# Patient Record
Sex: MALE | Race: ASIAN | NOT HISPANIC OR LATINO | URBAN - METROPOLITAN AREA
[De-identification: names, ages, dates, MRNs, and addresses within clinical notes are randomized per-mention and may not be internally consistent; named-entity substitution may affect disease eponyms.]

---

## 2018-04-18 ENCOUNTER — INPATIENT (INPATIENT)
Facility: HOSPITAL | Age: 63
LOS: 6 days | Discharge: HOME | DRG: 189 | End: 2018-04-25
Attending: HOSPITALIST | Admitting: INTERNAL MEDICINE
Payer: MEDICAID

## 2018-04-18 VITALS
HEART RATE: 56 BPM | OXYGEN SATURATION: 91 % | SYSTOLIC BLOOD PRESSURE: 186 MMHG | DIASTOLIC BLOOD PRESSURE: 82 MMHG | RESPIRATION RATE: 18 BRPM

## 2018-04-18 DIAGNOSIS — E11.9 TYPE 2 DIABETES MELLITUS WITHOUT COMPLICATIONS: ICD-10-CM

## 2018-04-18 DIAGNOSIS — I50.9 HEART FAILURE, UNSPECIFIED: ICD-10-CM

## 2018-04-18 DIAGNOSIS — J96.02 ACUTE RESPIRATORY FAILURE WITH HYPERCAPNIA: ICD-10-CM

## 2018-04-18 DIAGNOSIS — Z98.49 CATARACT EXTRACTION STATUS, UNSPECIFIED EYE: Chronic | ICD-10-CM

## 2018-04-18 DIAGNOSIS — I10 ESSENTIAL (PRIMARY) HYPERTENSION: ICD-10-CM

## 2018-04-18 DIAGNOSIS — Z29.9 ENCOUNTER FOR PROPHYLACTIC MEASURES, UNSPECIFIED: ICD-10-CM

## 2018-04-18 DIAGNOSIS — R00.1 BRADYCARDIA, UNSPECIFIED: ICD-10-CM

## 2018-04-18 LAB
ALBUMIN SERPL ELPH-MCNC: 3.6 G/DL — SIGNIFICANT CHANGE UP (ref 3.3–5)
ALP SERPL-CCNC: 67 U/L — SIGNIFICANT CHANGE UP (ref 40–120)
ALT FLD-CCNC: 12 U/L — SIGNIFICANT CHANGE UP (ref 10–45)
ANION GAP SERPL CALC-SCNC: 8 MMOL/L — SIGNIFICANT CHANGE UP (ref 5–17)
AST SERPL-CCNC: 10 U/L — SIGNIFICANT CHANGE UP (ref 10–40)
BASOPHILS # BLD AUTO: 0 K/UL — SIGNIFICANT CHANGE UP (ref 0–0.2)
BASOPHILS NFR BLD AUTO: 0.7 % — SIGNIFICANT CHANGE UP (ref 0–2)
BILIRUB SERPL-MCNC: 0.4 MG/DL — SIGNIFICANT CHANGE UP (ref 0.2–1.2)
BUN SERPL-MCNC: 12 MG/DL — SIGNIFICANT CHANGE UP (ref 7–23)
CALCIUM SERPL-MCNC: 9 MG/DL — SIGNIFICANT CHANGE UP (ref 8.4–10.5)
CHLORIDE SERPL-SCNC: 99 MMOL/L — SIGNIFICANT CHANGE UP (ref 96–108)
CO2 SERPL-SCNC: 33 MMOL/L — HIGH (ref 22–31)
CREAT SERPL-MCNC: 0.92 MG/DL — SIGNIFICANT CHANGE UP (ref 0.5–1.3)
EOSINOPHIL # BLD AUTO: 0.3 K/UL — SIGNIFICANT CHANGE UP (ref 0–0.5)
EOSINOPHIL NFR BLD AUTO: 6 % — SIGNIFICANT CHANGE UP (ref 0–6)
GAS PNL BLDV: SIGNIFICANT CHANGE UP
GLUCOSE SERPL-MCNC: 192 MG/DL — HIGH (ref 70–99)
HCT VFR BLD CALC: 42.4 % — SIGNIFICANT CHANGE UP (ref 39–50)
HGB BLD-MCNC: 13.4 G/DL — SIGNIFICANT CHANGE UP (ref 13–17)
LYMPHOCYTES # BLD AUTO: 1.1 K/UL — SIGNIFICANT CHANGE UP (ref 1–3.3)
LYMPHOCYTES # BLD AUTO: 24.5 % — SIGNIFICANT CHANGE UP (ref 13–44)
MCHC RBC-ENTMCNC: 27.3 PG — SIGNIFICANT CHANGE UP (ref 27–34)
MCHC RBC-ENTMCNC: 31.6 GM/DL — LOW (ref 32–36)
MCV RBC AUTO: 86.3 FL — SIGNIFICANT CHANGE UP (ref 80–100)
MONOCYTES # BLD AUTO: 0.4 K/UL — SIGNIFICANT CHANGE UP (ref 0–0.9)
MONOCYTES NFR BLD AUTO: 8.8 % — SIGNIFICANT CHANGE UP (ref 2–14)
NEUTROPHILS # BLD AUTO: 2.7 K/UL — SIGNIFICANT CHANGE UP (ref 1.8–7.4)
NEUTROPHILS NFR BLD AUTO: 60 % — SIGNIFICANT CHANGE UP (ref 43–77)
NT-PROBNP SERPL-SCNC: 2150 PG/ML — HIGH (ref 0–300)
PLATELET # BLD AUTO: 165 K/UL — SIGNIFICANT CHANGE UP (ref 150–400)
POTASSIUM SERPL-MCNC: 4.7 MMOL/L — SIGNIFICANT CHANGE UP (ref 3.5–5.3)
POTASSIUM SERPL-SCNC: 4.7 MMOL/L — SIGNIFICANT CHANGE UP (ref 3.5–5.3)
PROT SERPL-MCNC: 6.7 G/DL — SIGNIFICANT CHANGE UP (ref 6–8.3)
RBC # BLD: 4.91 M/UL — SIGNIFICANT CHANGE UP (ref 4.2–5.8)
RBC # FLD: 13.6 % — SIGNIFICANT CHANGE UP (ref 10.3–14.5)
SODIUM SERPL-SCNC: 140 MMOL/L — SIGNIFICANT CHANGE UP (ref 135–145)
TROPONIN T SERPL-MCNC: <0.01 NG/ML — SIGNIFICANT CHANGE UP (ref 0–0.06)
WBC # BLD: 4.5 K/UL — SIGNIFICANT CHANGE UP (ref 3.8–10.5)
WBC # FLD AUTO: 4.5 K/UL — SIGNIFICANT CHANGE UP (ref 3.8–10.5)

## 2018-04-18 PROCEDURE — 99223 1ST HOSP IP/OBS HIGH 75: CPT

## 2018-04-18 PROCEDURE — 99285 EMERGENCY DEPT VISIT HI MDM: CPT

## 2018-04-18 PROCEDURE — 71046 X-RAY EXAM CHEST 2 VIEWS: CPT | Mod: 26

## 2018-04-18 RX ORDER — ASPIRIN/CALCIUM CARB/MAGNESIUM 324 MG
243 TABLET ORAL ONCE
Qty: 0 | Refills: 0 | Status: COMPLETED | OUTPATIENT
Start: 2018-04-18 | End: 2018-04-18

## 2018-04-18 RX ORDER — LOSARTAN POTASSIUM 100 MG/1
1 TABLET, FILM COATED ORAL
Qty: 0 | Refills: 0 | COMMUNITY

## 2018-04-18 RX ORDER — INSULIN LISPRO 100/ML
VIAL (ML) SUBCUTANEOUS AT BEDTIME
Qty: 0 | Refills: 0 | Status: DISCONTINUED | OUTPATIENT
Start: 2018-04-18 | End: 2018-04-25

## 2018-04-18 RX ORDER — METFORMIN HYDROCHLORIDE 850 MG/1
1 TABLET ORAL
Qty: 0 | Refills: 0 | COMMUNITY

## 2018-04-18 RX ORDER — DEXTROSE 50 % IN WATER 50 %
1 SYRINGE (ML) INTRAVENOUS ONCE
Qty: 0 | Refills: 0 | Status: DISCONTINUED | OUTPATIENT
Start: 2018-04-18 | End: 2018-04-25

## 2018-04-18 RX ORDER — ASPIRIN/CALCIUM CARB/MAGNESIUM 324 MG
81 TABLET ORAL DAILY
Qty: 0 | Refills: 0 | Status: DISCONTINUED | OUTPATIENT
Start: 2018-04-18 | End: 2018-04-18

## 2018-04-18 RX ORDER — INSULIN LISPRO 100/ML
VIAL (ML) SUBCUTANEOUS
Qty: 0 | Refills: 0 | Status: DISCONTINUED | OUTPATIENT
Start: 2018-04-18 | End: 2018-04-25

## 2018-04-18 RX ORDER — FUROSEMIDE 40 MG
20 TABLET ORAL EVERY 12 HOURS
Qty: 0 | Refills: 0 | Status: DISCONTINUED | OUTPATIENT
Start: 2018-04-18 | End: 2018-04-20

## 2018-04-18 RX ORDER — GLUCAGON INJECTION, SOLUTION 0.5 MG/.1ML
1 INJECTION, SOLUTION SUBCUTANEOUS ONCE
Qty: 0 | Refills: 0 | Status: DISCONTINUED | OUTPATIENT
Start: 2018-04-18 | End: 2018-04-25

## 2018-04-18 RX ORDER — DEXTROSE 50 % IN WATER 50 %
25 SYRINGE (ML) INTRAVENOUS ONCE
Qty: 0 | Refills: 0 | Status: DISCONTINUED | OUTPATIENT
Start: 2018-04-18 | End: 2018-04-25

## 2018-04-18 RX ORDER — ENOXAPARIN SODIUM 100 MG/ML
40 INJECTION SUBCUTANEOUS EVERY 24 HOURS
Qty: 0 | Refills: 0 | Status: DISCONTINUED | OUTPATIENT
Start: 2018-04-18 | End: 2018-04-25

## 2018-04-18 RX ORDER — ASPIRIN/CALCIUM CARB/MAGNESIUM 324 MG
81 TABLET ORAL DAILY
Qty: 0 | Refills: 0 | Status: DISCONTINUED | OUTPATIENT
Start: 2018-04-19 | End: 2018-04-25

## 2018-04-18 RX ORDER — INSULIN GLARGINE 100 [IU]/ML
5 INJECTION, SOLUTION SUBCUTANEOUS AT BEDTIME
Qty: 0 | Refills: 0 | Status: DISCONTINUED | OUTPATIENT
Start: 2018-04-18 | End: 2018-04-25

## 2018-04-18 RX ORDER — CARVEDILOL PHOSPHATE 80 MG/1
12.5 CAPSULE, EXTENDED RELEASE ORAL EVERY 12 HOURS
Qty: 0 | Refills: 0 | Status: DISCONTINUED | OUTPATIENT
Start: 2018-04-18 | End: 2018-04-22

## 2018-04-18 RX ORDER — LOSARTAN POTASSIUM 100 MG/1
100 TABLET, FILM COATED ORAL DAILY
Qty: 0 | Refills: 0 | Status: DISCONTINUED | OUTPATIENT
Start: 2018-04-18 | End: 2018-04-25

## 2018-04-18 RX ORDER — FUROSEMIDE 40 MG
20 TABLET ORAL ONCE
Qty: 0 | Refills: 0 | Status: COMPLETED | OUTPATIENT
Start: 2018-04-18 | End: 2018-04-18

## 2018-04-18 RX ORDER — SITAGLIPTIN 50 MG/1
1 TABLET, FILM COATED ORAL
Qty: 0 | Refills: 0 | COMMUNITY

## 2018-04-18 RX ADMIN — Medication 243 MILLIGRAM(S): at 18:57

## 2018-04-18 RX ADMIN — LOSARTAN POTASSIUM 100 MILLIGRAM(S): 100 TABLET, FILM COATED ORAL at 22:25

## 2018-04-18 RX ADMIN — Medication 20 MILLIGRAM(S): at 20:40

## 2018-04-18 NOTE — H&P ADULT - NSHPREVIEWOFSYSTEMS_GEN_ALL_CORE
REVIEW OF SYSTEMS:    CONSTITUTIONAL: No fevers or chills  ENMT:  No ear pain, No vertigo or throat pain  EYES: No visual changes  or photophobia  NECK: No pain or stiffness  RESPIRATORY: see HPI   CARDIOVASCULAR: No chest pain or palpitations  GASTROINTESTINAL: No abdominal or epigastric pain. No nausea, vomiting, or hematemesis; No diarrhea or constipation. No melena or hematochezia.  MUSCULOSKELETAL: No joint swelling  or warmth.  GENITOURINARY: No dysuria, frequency or hematuria  NEUROLOGICAL: No numbness or weakness  PSYCHIATRIC: No depression or anhedonia.  ENDOCRINE: No sx hypoglycemic episodes.  SKIN: No itching, burning, rashes, or lesions

## 2018-04-18 NOTE — H&P ADULT - ATTENDING COMMENTS
Patient assigned to me by night hospitalist in charge for management and care for patient for this evening only. Care to be resumed by day hospitalist in the morning and thereafter.

## 2018-04-18 NOTE — ED PROVIDER NOTE - OBJECTIVE STATEMENT
62 yo M h/o of DM2 and HTN who reports months of exertional shortness of breath which has somewhat acutely worsened over the last month. Not associated with chest pain, cough, fevers, chills. Also c/o of chronic bilateral lower ext swelling which has not worsened over the past months. No recent travel, no history of PE/DVT, no history of immobilization. Pt. was sent to the ED by his cardiologist after evaluating him in the office today.

## 2018-04-18 NOTE — ED ADULT NURSE NOTE - OBJECTIVE STATEMENT
64 y/o male, a&o x3, brought in by [facility] c/o increased SOB on exertion and increased pedal edema x1 month. Pt reports SOB and edema have been ongoing x1 year, but have worsened in last 4 weeks. Airway patent, positive SOB at rest, respirations even and unlabored, no accessory muscle use, no retractions, no nasal flaring, no pursed lip breathing, no cyanosis, lungs clear and equal B/L, no cough.  No chest pain, no palpitations, cardiac monitor in place, NSR. Abdomen soft, nontender, distended, bowel sounds x4 quadrants, no nausea, no vomiting, no diarrhea, no constipation, no dysuria, no hematuria, no urinary frequency. Edema of bilat lower extremities. Safety maintained, stretcher locked in low position. Advised of plan of care.

## 2018-04-18 NOTE — ED PROVIDER NOTE - MUSCULOSKELETAL NEGATIVE STATEMENT, MLM
no back pain, no gout, no musculoskeletal pain, no neck pain, and no weakness. (+) LE swelling (chronic)

## 2018-04-18 NOTE — H&P ADULT - HISTORY OF PRESENT ILLNESS
64 yo M w/ DM2 & HTN presents with shortness of breath and leg swelling for 2-years.  Patient reports he has had this issue for two years now but it has become progressively and impaired his ability to walk. He reports there is no leg weakness but feels his legs are "stiff" from the fluid. However, he has not had chest pain in two years. He also denies any atypical angina symptoms. He denies syncope and prior use of diuretics. He reports seeing cardiologist today in office and was referred to ER. He denies any tearing back pain, recent URI sx, and denies prior stress testing. Reportedly states last TTE was 2-years ago and wnl to his knowledge.

## 2018-04-18 NOTE — H&P ADULT - NSHPLABSRESULTS_GEN_ALL_CORE
13.4   4.5   )-----------( 165      ( 18 Apr 2018 18:54 )             42.4       04-18    140  |  99  |  12  ----------------------------<  192<H>  4.7   |  33<H>  |  0.92    Ca    9.0      18 Apr 2018 18:54    TPro  6.7  /  Alb  3.6  /  TBili  0.4  /  DBili  x   /  AST  10  /  ALT  12  /  AlkPhos  67  04-18          CARDIAC MARKERS ( 18 Apr 2018 18:54 )  x     / <0.01 ng/mL / x     / x     / x        I personally reviewed & interpreted the lab findings above; CBC and CMP and CE largely unremarkable. VBG c/w hypercapnic acidosis with elevated bicarbonate c/w chronic retention  I personally reviewed & interpreted the radiographic findings; CXR shows no focal consolidation   I personally reviewed & interpreted the EKG findings; No active ischemic changes; T-wave inversion V1-V3. Sinus nilda

## 2018-04-18 NOTE — ED ADULT NURSE NOTE - CHPI ED SYMPTOMS NEG
no nausea/no fever/no weakness/no decreased eating/drinking/no tingling/no vomiting/no pain/no numbness/no dizziness/no chills

## 2018-04-18 NOTE — H&P ADULT - PROBLEM SELECTOR PLAN 1
-Suspect driven by heart failure based on history & exam.  -Patient reports improvement in symptoms w./ IV furosemide 20mg. Given Lasix naive and diuresing, will c/w furosemide 20mg IV BID.  -check daily weights  -Monitor electrolytes on IV diuretics  -Monitor on telemetry  -C/w ASA and coreg  -Check CEx2  -Check TTE

## 2018-04-18 NOTE — ED PROVIDER NOTE - ATTENDING CONTRIBUTION TO CARE
64 y/o male with a h/o HTN and DM who presents with a cc of SOB as has been described above. It has been months in duration, precipitated/exacerbated on exertion, relieved to a degree by rest and worsening over the course of the past few weeks. He has had bilateral lower extremity edema for a similar period of time. He denies any h/o CHF. On exam, the patient appears a bit dyspneic while speaking with an SpO2 of 93% on RA. VSs noted, neck supple, lungs c/ bibasilar crackles, cardiac sounds s/ audible m/r/g, abdomen soft, NT/ND, extremities s/ asymmetry, calf ttp or palpable cord, skin c/ 1-2+ B/L LE pitting edema and neurologically intact. EKG reveals non-specific ST-T changes. CXR interpreted as clear. Labs significant for a BNP of 2150 with a normal creatinine and a VBG that suggests chronic lung disease. While he has no prior h/o CHF, his presenting complaints and our findings suggest CHF. We will treat and disposition as such.

## 2018-04-18 NOTE — ED PROVIDER NOTE - MUSCULOSKELETAL, MLM
Spine appears normal, range of motion is not limited, no muscle or joint tenderness. LE swelling, 1+ pitting, non tender.

## 2018-04-18 NOTE — H&P ADULT - NSHPPHYSICALEXAM_GEN_ALL_CORE
PHYSICAL EXAM:  Vital Signs Last 24 Hrs  T(C): 36.3 (04-18-18 @ 18:43)  T(F): 97.4 (04-18-18 @ 18:43), Max: 97.4 (04-18-18 @ 18:43)  HR: 59 (04-18-18 @ 20:40) (56 - 80)  BP: 133/66 (04-18-18 @ 20:40)  BP(mean): --  RR: 20 (04-18-18 @ 20:40) (18 - 20)  SpO2: 100% (04-18-18 @ 20:40) (91% - 100%)  Wt(kg): --    Constitutional: NAD, awake and alert  EYES: EOMI  ENT:  Normal Hearing, no tonsillar exudates   Neck: Soft and supple , no thyromegaly   Respiratory: Breath sounds +crackles bilateral bases but patient has poor respiratory effort. No distress, no cyanosis   Cardiovascular: S1 and S2, regular rate and rhythm, no Murmurs, gallops or rubs, no JVD,  +pitting edema bilaterally & symmetrical.   Gastrointestinal: Bowel Sounds present, soft, nontender, nondistended, no guarding, no rebound  Extremities: No cyanosis or clubbing; warm to touch  Vascular: 2+ peripheral pulses lower ex  Neurological: No focal deficits, CN II-XII intact bilaterally, sensation to light touch intact in all extremities, gait intact.  Musculoskeletal: 5/5 strength b/l upper and lower extremities; no joint swelling.  Skin: No rashes  Psych: no depression or anhedonia, AAOx3  HEME: no bruises, no nose bleeds

## 2018-04-18 NOTE — H&P ADULT - PROBLEM SELECTOR PLAN 3
-Hold home oral DM meds  -Place on Diabetic diet  -Start basal bolus insulin therapy.  -check FSG TID and bedtime

## 2018-04-18 NOTE — ED ADULT NURSE REASSESSMENT NOTE - NS ED NURSE REASSESS COMMENT FT1
War room called reporting pt HR in low 40's. Pt is resting comfortably on stretcher sleeping in no acute distress. Will continue to monitor.

## 2018-04-18 NOTE — H&P ADULT - ASSESSMENT
62 yo M w/ DM2 & HTN presents with shortness of breath and leg swelling for 2-years likely acute on chronic heart failure.

## 2018-04-18 NOTE — ED ADULT NURSE REASSESSMENT NOTE - NS ED NURSE REASSESS COMMENT FT1
Pt a&o x3, resting on stretcher, in no acute distress, vital signs stable. Medicated as per order. Provided with urinal. Advised to ask for assistance if necessary.

## 2018-04-18 NOTE — ED PROVIDER NOTE - CARE PLAN
Principal Discharge DX:	CHF (congestive heart failure), NYHA class I  Secondary Diagnosis:	Lower extremity edema  Secondary Diagnosis:	Shortness of breath

## 2018-04-18 NOTE — ED PROVIDER NOTE - PROGRESS NOTE DETAILS
Soraida RAMIREZ , T[employer] Fellow - HD stable, in no apparent resp distress at rest. Trop <0.01, BNP 2100, ph7.32/70/30, la1.7, likely chronic resp acidosis. Pending CXR.

## 2018-04-19 LAB
ALBUMIN SERPL ELPH-MCNC: 3.5 G/DL — SIGNIFICANT CHANGE UP (ref 3.3–5)
ALP SERPL-CCNC: 64 U/L — SIGNIFICANT CHANGE UP (ref 40–120)
ALT FLD-CCNC: 15 U/L — SIGNIFICANT CHANGE UP (ref 10–45)
ANION GAP SERPL CALC-SCNC: 9 MMOL/L — SIGNIFICANT CHANGE UP (ref 5–17)
AST SERPL-CCNC: 14 U/L — SIGNIFICANT CHANGE UP (ref 10–40)
BILIRUB SERPL-MCNC: 0.5 MG/DL — SIGNIFICANT CHANGE UP (ref 0.2–1.2)
BUN SERPL-MCNC: 11 MG/DL — SIGNIFICANT CHANGE UP (ref 7–23)
CALCIUM SERPL-MCNC: 9.2 MG/DL — SIGNIFICANT CHANGE UP (ref 8.4–10.5)
CHLORIDE SERPL-SCNC: 98 MMOL/L — SIGNIFICANT CHANGE UP (ref 96–108)
CK MB CFR SERPL CALC: 1.6 NG/ML — SIGNIFICANT CHANGE UP (ref 0–6.7)
CK SERPL-CCNC: 38 U/L — SIGNIFICANT CHANGE UP (ref 30–200)
CO2 SERPL-SCNC: 33 MMOL/L — HIGH (ref 22–31)
CREAT SERPL-MCNC: 0.89 MG/DL — SIGNIFICANT CHANGE UP (ref 0.5–1.3)
GLUCOSE SERPL-MCNC: 96 MG/DL — SIGNIFICANT CHANGE UP (ref 70–99)
HCT VFR BLD CALC: 41.1 % — SIGNIFICANT CHANGE UP (ref 39–50)
HGB BLD-MCNC: 12.6 G/DL — LOW (ref 13–17)
MCHC RBC-ENTMCNC: 26.8 PG — LOW (ref 27–34)
MCHC RBC-ENTMCNC: 30.7 GM/DL — LOW (ref 32–36)
MCV RBC AUTO: 87.3 FL — SIGNIFICANT CHANGE UP (ref 80–100)
PLATELET # BLD AUTO: 151 K/UL — SIGNIFICANT CHANGE UP (ref 150–400)
POTASSIUM SERPL-MCNC: 4.2 MMOL/L — SIGNIFICANT CHANGE UP (ref 3.5–5.3)
POTASSIUM SERPL-SCNC: 4.2 MMOL/L — SIGNIFICANT CHANGE UP (ref 3.5–5.3)
PROT SERPL-MCNC: 6.6 G/DL — SIGNIFICANT CHANGE UP (ref 6–8.3)
RBC # BLD: 4.71 M/UL — SIGNIFICANT CHANGE UP (ref 4.2–5.8)
RBC # FLD: 14.7 % — HIGH (ref 10.3–14.5)
SODIUM SERPL-SCNC: 140 MMOL/L — SIGNIFICANT CHANGE UP (ref 135–145)
T3 SERPL-MCNC: 104 NG/DL — SIGNIFICANT CHANGE UP (ref 80–200)
T4 AB SER-ACNC: 8.9 UG/DL — SIGNIFICANT CHANGE UP (ref 4.6–12)
TROPONIN T SERPL-MCNC: <0.01 NG/ML — SIGNIFICANT CHANGE UP (ref 0–0.06)
TSH SERPL-MCNC: 3.37 UIU/ML — SIGNIFICANT CHANGE UP (ref 0.27–4.2)
TSH SERPL-MCNC: 4.19 UIU/ML — SIGNIFICANT CHANGE UP (ref 0.27–4.2)
WBC # BLD: 4.25 K/UL — SIGNIFICANT CHANGE UP (ref 3.8–10.5)
WBC # FLD AUTO: 4.25 K/UL — SIGNIFICANT CHANGE UP (ref 3.8–10.5)

## 2018-04-19 PROCEDURE — 99233 SBSQ HOSP IP/OBS HIGH 50: CPT

## 2018-04-19 RX ORDER — METOPROLOL TARTRATE 50 MG
25 TABLET ORAL
Qty: 0 | Refills: 0 | Status: DISCONTINUED | OUTPATIENT
Start: 2018-04-19 | End: 2018-04-19

## 2018-04-19 RX ADMIN — CARVEDILOL PHOSPHATE 12.5 MILLIGRAM(S): 80 CAPSULE, EXTENDED RELEASE ORAL at 06:42

## 2018-04-19 RX ADMIN — Medication 20 MILLIGRAM(S): at 06:42

## 2018-04-19 RX ADMIN — INSULIN GLARGINE 5 UNIT(S): 100 INJECTION, SOLUTION SUBCUTANEOUS at 21:21

## 2018-04-19 RX ADMIN — Medication 1: at 11:59

## 2018-04-19 RX ADMIN — Medication 20 MILLIGRAM(S): at 17:17

## 2018-04-19 RX ADMIN — ENOXAPARIN SODIUM 40 MILLIGRAM(S): 100 INJECTION SUBCUTANEOUS at 06:42

## 2018-04-19 RX ADMIN — LOSARTAN POTASSIUM 100 MILLIGRAM(S): 100 TABLET, FILM COATED ORAL at 06:42

## 2018-04-19 RX ADMIN — Medication 81 MILLIGRAM(S): at 12:00

## 2018-04-19 RX ADMIN — CARVEDILOL PHOSPHATE 12.5 MILLIGRAM(S): 80 CAPSULE, EXTENDED RELEASE ORAL at 17:17

## 2018-04-19 NOTE — CONSULT NOTE ADULT - SUBJECTIVE AND OBJECTIVE BOX
HISTORY OF PRESENT ILLNESS: 64 yo M well know to our office w/ DM2 & HTN presents with shortness of breath and leg swelling for 2-years.  Patient reports he has had this issue for two years now but it has become progressively and impaired his ability to walk. He reports there is no leg weakness but feels his legs are "stiff" from the fluid. However, he has not had chest pain in two years. He also denies any atypical angina symptoms. He denies syncope and prior use of diuretics. He reports seeing cardiologist today in office and was referred to ER. He denies any tearing back pain, recent URI sx, and denies prior stress testing. Reportedly states last TTE was 2-years ago and wnl to his knowledge.       PAST MEDICAL & SURGICAL HISTORY:  Diabetes mellitus  Hyperlipidemia  Hypertension  History of cataract surgery      PREVIOUS DIAGNOSTIC TESTING:    [ ] Echocardiogram: 11/30/16 In Office       NL LVS fx EF 71%      Mild DD      Nl RV fx   Mild LAE   [ ]  Catheterization:  [ ] Stress Test:  	    MEDICATIONS:  aspirin enteric coated 81 milliGRAM(s) Oral daily  carvedilol 12.5 milliGRAM(s) Oral every 12 hours  enoxaparin Injectable 40 milliGRAM(s) SubCutaneous every 24 hours  furosemide   Injectable 20 milliGRAM(s) IV Push every 12 hours  losartan 100 milliGRAM(s) Oral daily      dextrose 50% Injectable 25 Gram(s) IV Push once  dextrose 50% Injectable 25 Gram(s) IV Push once  dextrose Gel 1 Dose(s) Oral once PRN  glucagon  Injectable 1 milliGRAM(s) IntraMuscular once PRN  insulin glargine Injectable (LANTUS) 5 Unit(s) SubCutaneous at bedtime  insulin lispro (HumaLOG) corrective regimen sliding scale   SubCutaneous three times a day before meals  insulin lispro (HumaLOG) corrective regimen sliding scale   SubCutaneous at bedtime        Allergies    No Known Allergies    Intolerances        FAMILY HISTORY:  No pertinent family history in first degree relatives      SOCIAL HISTORY:    [x ] Non-smoker  [ ] Former Smoker  [ ] Current Smoker  [ ] Alcohol      REVIEW OF SYSTEMS:  [ ]chest pain  [ x ]shortness of breath  [  ]palpitations  [  ]syncope  [ ]near syncope [  ]diplopia  [  ]altered mental status   [  ]fevers  [ ]chills [ ]nausea  [ ] vomiting  [ ]abdominal pain  [ ]melena  [ ]BRBPR  [  ]epistaxis  [  ]rash  [x]lower extremity edema          [x] All others negative	  [ ] Unable to obtain    PHYSICAL EXAM:  T(C): 36.4 (04-19-18 @ 11:36), Max: 36.9 (04-19-18 @ 00:32)  HR: 55 (04-19-18 @ 11:36) (50 - 80)  BP: 128/65 (04-19-18 @ 11:36) (128/65 - 186/82)  RR: 18 (04-19-18 @ 11:36) (18 - 20)  SpO2: 95% (04-19-18 @ 11:36) (91% - 100%)  Wt(kg): --  I&O's Summary    18 Apr 2018 07:01  -  19 Apr 2018 07:00  --------------------------------------------------------  IN: 220 mL / OUT: 0 mL / NET: 220 mL    19 Apr 2018 07:01  -  19 Apr 2018 15:57  --------------------------------------------------------  IN: 720 mL / OUT: 800 mL / NET: -80 mL        Appearance: No Signs of acute distress  HEENT:   Normal oral mucosa, PERRL, EOMI	  Lymphatic: No lymphadenopathy  Cardiovascular: Normal S1 S2, RRR No JVD, 1/6 systolic  murmurs,  Respiratory: Lungs clear to auscultation	  Gastrointestinal:  Soft, Non-tender, + BS	  Skin: No rashes, No ecchymoses, No cyanosis	  Extremities:  No clubbing, cyanosis + edema B/L LE's   Vascular: Peripheral pulses palpable 2+ bilaterally    TELEMETRY: NSR 	    ECG:   No active ischemic changes; T-wave inversion V1-V3. Sinus nilda	  RADIOLOGY:  < from: Xray Chest 2 Views PA/Lat (04.18.18 @ 20:14) >  IMPRESSION:   Trace bilateral pleural effusions.    < end of copied text >    OTHER: 	  	  LABS:	 	    CARDIAC MARKERS:                                  12.6   4.25  )-----------( 151      ( 19 Apr 2018 10:31 )             41.1     04-19    140  |  98  |  11  ----------------------------<  96  4.2   |  33<H>  |  0.89    Ca    9.2      19 Apr 2018 08:38    TPro  6.6  /  Alb  3.5  /  TBili  0.5  /  DBili  x   /  AST  14  /  ALT  15  /  AlkPhos  64  04-19    proBNP: Serum Pro-Brain Natriuretic Peptide: 2150 pg/mL (04-18 @ 18:54)    Lipid Profile:   HgA1c:   TSH: Thyroid Stimulating Hormone, Serum: 4.19 uIU/mL (04-18 @ 23:13)      ASSESSMENT/PLAN:  64 yo M well know to our office w/ DM2 & HTN presents with shortness of breath and leg swelling for 2-years.  Patient reports he has had this issue for two years now but it has become progressively and impaired his ability to walk. He reports there is no leg weakness but feels his legs are "stiff" from the fluid.   Trops neg x 2   SBNP 2150  CXR trace B/L pleural effusions   c/w IV lasix      monitor Cr & K levels  daily weights     keep I's<O's   Echo pending

## 2018-04-19 NOTE — CONSULT NOTE ADULT - ATTENDING COMMENTS
Patient seen and examined, agree with above assessment and plan as transcribed above.    - Check echo  - further recc pending above    Josh Jacobson MD, FACC  [facility], [facility]  [address] [place] [address]  PHONE:  [phone]  BEEPER : [phone]

## 2018-04-19 NOTE — PROGRESS NOTE ADULT - SUBJECTIVE AND OBJECTIVE BOX
Darren López MD  Division of Hospital Medicine  Pager [phone]      DAISY SHELTON  63y  Male      Patient is a 63y old  Male who presents with a chief complaint of shortness of breath and leg swelling (18 Apr 2018 21:28)      INTERVAL HPI/OVERNIGHT EVENTS:  Seen at bedside. States pain and swelling in legs has improved.       REVIEW OF SYSTEMS: 14 point ROS negative unless listed above    T(C): 36.4 (04-19-18 @ 11:36), Max: 36.9 (04-19-18 @ 00:32)  HR: 55 (04-19-18 @ 11:36) (50 - 80)  BP: 128/65 (04-19-18 @ 11:36) (128/65 - 186/82)  RR: 18 (04-19-18 @ 11:36) (18 - 20)  SpO2: 95% (04-19-18 @ 11:36) (91% - 100%)  Wt(kg): --Vital Signs Last 24 Hrs  T(C): 36.4 (19 Apr 2018 11:36), Max: 36.9 (19 Apr 2018 00:32)  T(F): 97.6 (19 Apr 2018 11:36), Max: 98.4 (19 Apr 2018 00:32)  HR: 55 (19 Apr 2018 11:36) (50 - 80)  BP: 128/65 (19 Apr 2018 11:36) (128/65 - 186/82)  BP(mean): --  RR: 18 (19 Apr 2018 11:36) (18 - 20)  SpO2: 95% (19 Apr 2018 11:36) (91% - 100%)    PHYSICAL EXAM:  GENERAL: NAD, well-groomed, well-developed  HEAD:  Atraumatic, Normocephalic  EYES: EOMI, PERRLA, conjunctiva and sclera clear  ENMT: No tonsillar erythema, exudates,; Moist mucous membranes. No lesions  NECK: Supple, No JVD  CHEST/LUNG: CTA; No rales, rhonchi, wheezing, or rubs  HEART: Regular rate and rhythm; No murmurs, rubs, or gallops  ABDOMEN: Soft, Nontender, Nondistended; Bowel sounds present.  EXTREMITIES:  2+ Peripheral Pulses, No clubbing, cyanosis, +1 edema  PSYCH: Alert & Oriented x3      Consultant(s) Notes Reviewed:  [x ] YES  [ ] NO  Care Discussed with Consultants/Other Providers [ x] YES  [ ] NO    LABS:                        12.6   4.25  )-----------( 151      ( 19 Apr 2018 10:31 )             41.1     04-19    140  |  98  |  11  ----------------------------<  96  4.2   |  33<H>  |  0.89    Ca    9.2      19 Apr 2018 08:38    TPro  6.6  /  Alb  3.5  /  TBili  0.5  /  DBili  x   /  AST  14  /  ALT  15  /  AlkPhos  64  04-19        CAPILLARY BLOOD GLUCOSE      POCT Blood Glucose.: 160 mg/dL (19 Apr 2018 11:38)  POCT Blood Glucose.: 92 mg/dL (19 Apr 2018 07:50)  POCT Blood Glucose.: 110 mg/dL (18 Apr 2018 22:33)            RADIOLOGY & ADDITIONAL TESTS:    Imaging Personally Reviewed:  [x ] YES  [ ] NO

## 2018-04-20 LAB
ANION GAP SERPL CALC-SCNC: 9 MMOL/L — SIGNIFICANT CHANGE UP (ref 5–17)
BUN SERPL-MCNC: 14 MG/DL — SIGNIFICANT CHANGE UP (ref 7–23)
CALCIUM SERPL-MCNC: 9.2 MG/DL — SIGNIFICANT CHANGE UP (ref 8.4–10.5)
CHLORIDE SERPL-SCNC: 96 MMOL/L — SIGNIFICANT CHANGE UP (ref 96–108)
CO2 SERPL-SCNC: 36 MMOL/L — HIGH (ref 22–31)
CREAT SERPL-MCNC: 1.01 MG/DL — SIGNIFICANT CHANGE UP (ref 0.5–1.3)
GLUCOSE SERPL-MCNC: 119 MG/DL — HIGH (ref 70–99)
HCT VFR BLD CALC: 37.5 % — LOW (ref 39–50)
HGB BLD-MCNC: 11.6 G/DL — LOW (ref 13–17)
MAGNESIUM SERPL-MCNC: 2 MG/DL — SIGNIFICANT CHANGE UP (ref 1.6–2.6)
MCHC RBC-ENTMCNC: 26.1 PG — LOW (ref 27–34)
MCHC RBC-ENTMCNC: 30.9 GM/DL — LOW (ref 32–36)
MCV RBC AUTO: 84.3 FL — SIGNIFICANT CHANGE UP (ref 80–100)
PLATELET # BLD AUTO: 145 K/UL — LOW (ref 150–400)
POTASSIUM SERPL-MCNC: 4 MMOL/L — SIGNIFICANT CHANGE UP (ref 3.5–5.3)
POTASSIUM SERPL-SCNC: 4 MMOL/L — SIGNIFICANT CHANGE UP (ref 3.5–5.3)
RBC # BLD: 4.45 M/UL — SIGNIFICANT CHANGE UP (ref 4.2–5.8)
RBC # FLD: 15 % — HIGH (ref 10.3–14.5)
SODIUM SERPL-SCNC: 141 MMOL/L — SIGNIFICANT CHANGE UP (ref 135–145)
WBC # BLD: 4.17 K/UL — SIGNIFICANT CHANGE UP (ref 3.8–10.5)
WBC # FLD AUTO: 4.17 K/UL — SIGNIFICANT CHANGE UP (ref 3.8–10.5)

## 2018-04-20 PROCEDURE — 76700 US EXAM ABDOM COMPLETE: CPT | Mod: 26

## 2018-04-20 PROCEDURE — 99233 SBSQ HOSP IP/OBS HIGH 50: CPT

## 2018-04-20 RX ORDER — FINASTERIDE 5 MG/1
5 TABLET, FILM COATED ORAL DAILY
Qty: 0 | Refills: 0 | Status: DISCONTINUED | OUTPATIENT
Start: 2018-04-20 | End: 2018-04-20

## 2018-04-20 RX ORDER — PANTOPRAZOLE SODIUM 20 MG/1
40 TABLET, DELAYED RELEASE ORAL
Qty: 0 | Refills: 0 | Status: DISCONTINUED | OUTPATIENT
Start: 2018-04-20 | End: 2018-04-25

## 2018-04-20 RX ORDER — FUROSEMIDE 40 MG
40 TABLET ORAL
Qty: 0 | Refills: 0 | Status: DISCONTINUED | OUTPATIENT
Start: 2018-04-20 | End: 2018-04-25

## 2018-04-20 RX ADMIN — Medication 81 MILLIGRAM(S): at 11:31

## 2018-04-20 RX ADMIN — INSULIN GLARGINE 5 UNIT(S): 100 INJECTION, SOLUTION SUBCUTANEOUS at 21:53

## 2018-04-20 RX ADMIN — PANTOPRAZOLE SODIUM 40 MILLIGRAM(S): 20 TABLET, DELAYED RELEASE ORAL at 12:16

## 2018-04-20 RX ADMIN — ENOXAPARIN SODIUM 40 MILLIGRAM(S): 100 INJECTION SUBCUTANEOUS at 06:10

## 2018-04-20 RX ADMIN — LOSARTAN POTASSIUM 100 MILLIGRAM(S): 100 TABLET, FILM COATED ORAL at 06:10

## 2018-04-20 RX ADMIN — Medication 20 MILLIGRAM(S): at 06:10

## 2018-04-20 RX ADMIN — CARVEDILOL PHOSPHATE 12.5 MILLIGRAM(S): 80 CAPSULE, EXTENDED RELEASE ORAL at 06:10

## 2018-04-20 RX ADMIN — Medication 40 MILLIGRAM(S): at 17:53

## 2018-04-20 NOTE — PROGRESS NOTE ADULT - SUBJECTIVE AND OBJECTIVE BOX
SUBJECTIVE:      MEDICATIONS  (STANDING):  aspirin enteric coated 81 milliGRAM(s) Oral daily  carvedilol 12.5 milliGRAM(s) Oral every 12 hours  enoxaparin Injectable 40 milliGRAM(s) SubCutaneous every 24 hours  furosemide   Injectable 20 milliGRAM(s) IV Push every 12 hours  insulin glargine Injectable (LANTUS) 5 Unit(s) SubCutaneous at bedtime  insulin lispro (HumaLOG) corrective regimen sliding scale   SubCutaneous three times a day before meals  insulin lispro (HumaLOG) corrective regimen sliding scale   SubCutaneous at bedtime  losartan 100 milliGRAM(s) Oral daily    MEDICATIONS  (PRN):  dextrose Gel 1 Dose(s) Oral once PRN Blood Glucose LESS THAN 70 milliGRAM(s)/deciliter  glucagon  Injectable 1 milliGRAM(s) IntraMuscular once PRN Glucose LESS THAN 70 milligrams/deciliter      LABS:                        11.6   4.17  )-----------( 145      ( 20 Apr 2018 07:57 )             37.5       04-20    141  |  96  |  14  ----------------------------<  119<H>  4.0   |  36<H>  |  1.01    Ca    9.2      20 Apr 2018 08:45  Mg     2.0     04-20    TPro  6.6  /  Alb  3.5  /  TBili  0.5  /  DBili  x   /  AST  14  /  ALT  15  /  AlkPhos  64  04-19        CARDIAC MARKERS ( 19 Apr 2018 08:38 )  x     / <0.01 ng/mL / 38 U/L / x     / 1.6 ng/mL  CARDIAC MARKERS ( 18 Apr 2018 18:54 )  x     / <0.01 ng/mL / x     / x     / x          Serum Pro-Brain Natriuretic Peptide: 2150 pg/mL (04-18 @ 18:54)      PHYSICAL EXAM:  Vital Signs Last 24 Hrs  T(C): 36.6 (20 Apr 2018 04:48), Max: 36.7 (19 Apr 2018 20:32)  T(F): 97.9 (20 Apr 2018 04:48), Max: 98 (19 Apr 2018 20:32)  HR: 64 (20 Apr 2018 04:48) (55 - 64)  BP: 161/71 (20 Apr 2018 04:48) (128/65 - 161/71)  BP(mean): --  RR: 18 (20 Apr 2018 04:48) (18 - 18)  SpO2: 93% (20 Apr 2018 04:48) (93% - 95%)    HEENT: Normal Oral mucosa, PERRL, EOMI  Lymphatic: No obvious lymphadenopathy, +BL LE edema  Cardiovascular: Normal S1S2, No JVD, 1/6 AYAKA, Peripheral pulses palpable 2+ B/L  Respiratory: Lungs clear to auscultation, normal effort  Gastrointestinal: Abdomen soft, ND, NT, +BS  Skin: Warm, dry, intact. No cyanosis, No rash.  Musculoskeletal: Normal ROM, normal strength  Psychiatric: Appropriate Mood and Affect      DIAGNOSTIC DATA  TELEMETRY: NSR 50-70s     RADIOLOGY:   < from: Xray Chest 2 Views PA/Lat (04.18.18 @ 20:14) >  FINDINGS:  The lungs are clear.  Trace bilateral pleural effusions.  No pneumothorax.  The cardiomediastinal silhouette is within normal limits.  Age indeterminant right posterior fourth and fifth rib fracture.        TTE : 2016    Conclusions:   1. Normal left ventricular size with normal systolic function.   2. Mild diastolic dysfunction.   3. Normal right ventricular size and function.   4. Mild left atrial enlargement.       ASSESSMENT AND PLAN:  This is a 63 year old male known to our office with HTN DM with historically normal LV function on TTE 2016 with no prior ischemic evaluation on file admitted with     --   -- repeat TTE pending   -- Patient with no anginal chest pain or shortness of breath  c/o abdominal fullness and continued LE edema   ROS otherwise      MEDICATIONS  (STANDING):  aspirin enteric coated 81 milliGRAM(s) Oral daily  carvedilol 12.5 milliGRAM(s) Oral every 12 hours  enoxaparin Injectable 40 milliGRAM(s) SubCutaneous every 24 hours  furosemide   Injectable 20 milliGRAM(s) IV Push every 12 hours  insulin glargine Injectable (LANTUS) 5 Unit(s) SubCutaneous at bedtime  insulin lispro (HumaLOG) corrective regimen sliding scale   SubCutaneous three times a day before meals  insulin lispro (HumaLOG) corrective regimen sliding scale   SubCutaneous at bedtime  losartan 100 milliGRAM(s) Oral daily    MEDICATIONS  (PRN):  dextrose Gel 1 Dose(s) Oral once PRN Blood Glucose LESS THAN 70 milliGRAM(s)/deciliter  glucagon  Injectable 1 milliGRAM(s) IntraMuscular once PRN Glucose LESS THAN 70 milligrams/deciliter      LABS:                        11.6   4.17  )-----------( 145      ( 20 Apr 2018 07:57 )             37.5       04-20    141  |  96  |  14  ----------------------------<  119<H>  4.0   |  36<H>  |  1.01    Ca    9.2      20 Apr 2018 08:45  Mg     2.0     04-20    TPro  6.6  /  Alb  3.5  /  TBili  0.5  /  DBili  x   /  AST  14  /  ALT  15  /  AlkPhos  64  04-19        CARDIAC MARKERS ( 19 Apr 2018 08:38 )  x     / <0.01 ng/mL / 38 U/L / x     / 1.6 ng/mL  CARDIAC MARKERS ( 18 Apr 2018 18:54 )  x     / <0.01 ng/mL / x     / x     / x          Serum Pro-Brain Natriuretic Peptide: 2150 pg/mL (04-18 @ 18:54)      PHYSICAL EXAM:  Vital Signs Last 24 Hrs  T(C): 36.6 (20 Apr 2018 04:48), Max: 36.7 (19 Apr 2018 20:32)  T(F): 97.9 (20 Apr 2018 04:48), Max: 98 (19 Apr 2018 20:32)  HR: 64 (20 Apr 2018 04:48) (55 - 64)  BP: 161/71 (20 Apr 2018 04:48) (128/65 - 161/71)  BP(mean): --  RR: 18 (20 Apr 2018 04:48) (18 - 18)  SpO2: 93% (20 Apr 2018 04:48) (93% - 95%)    HEENT: Normal Oral mucosa, PERRL, EOMI  Lymphatic: No obvious lymphadenopathy, +BL LE edema  Cardiovascular: Normal S1S2, No JVD, 1/6 AYAKA, Peripheral pulses palpable 2+ B/L  Respiratory: Lungs clear to auscultation, normal effort  Gastrointestinal: Abdomen soft, ND, NT, +BS  Skin: Warm, dry, intact. No cyanosis, No rash.  Musculoskeletal: Normal ROM, normal strength  Psychiatric: Appropriate Mood and Affect      DIAGNOSTIC DATA  TELEMETRY: NSR 50-70s     RADIOLOGY:   < from: Xray Chest 2 Views PA/Lat (04.18.18 @ 20:14) >  FINDINGS:  The lungs are clear.  Trace bilateral pleural effusions.  No pneumothorax.  The cardiomediastinal silhouette is within normal limits.  Age indeterminant right posterior fourth and fifth rib fracture.        TTE : 2016    Conclusions:   1. Normal left ventricular size with normal systolic function.   2. Mild diastolic dysfunction.   3. Normal right ventricular size and function.   4. Mild left atrial enlargement.       ASSESSMENT AND PLAN:  This is a 63 year old male known to our office with HTN DM with historically normal LV function on TTE 2016 with no prior ischemic evaluation on file admitted with progressively worsening SOB and BL LE edema:     -- The patient has ruled out for acute coronary syndrome with negative serial cardiac enzymes  -- repeat TTE pending to assess LV function  -- c/w Lasix IV to keep net negative  -- consider abdominal u/s given abdominal fullness  -- HTN - c/w Coreg and Cozaar   -- monitor platelets - no evidence bleeding  -- care per primary team   -- f/u with Dr Cespedes upon dc for cardiology    Kimberly Mckeon [facility]  O:  [phone]  P: [phone] Patient with no anginal chest pain or shortness of breath  c/o abdominal fullness and continued LE edema   ROS otherwise      MEDICATIONS  (STANDING):  aspirin enteric coated 81 milliGRAM(s) Oral daily  carvedilol 12.5 milliGRAM(s) Oral every 12 hours  enoxaparin Injectable 40 milliGRAM(s) SubCutaneous every 24 hours  furosemide   Injectable 20 milliGRAM(s) IV Push every 12 hours  insulin glargine Injectable (LANTUS) 5 Unit(s) SubCutaneous at bedtime  insulin lispro (HumaLOG) corrective regimen sliding scale   SubCutaneous three times a day before meals  insulin lispro (HumaLOG) corrective regimen sliding scale   SubCutaneous at bedtime  losartan 100 milliGRAM(s) Oral daily    MEDICATIONS  (PRN):  dextrose Gel 1 Dose(s) Oral once PRN Blood Glucose LESS THAN 70 milliGRAM(s)/deciliter  glucagon  Injectable 1 milliGRAM(s) IntraMuscular once PRN Glucose LESS THAN 70 milligrams/deciliter      LABS:                        11.6   4.17  )-----------( 145      ( 20 Apr 2018 07:57 )             37.5       04-20    141  |  96  |  14  ----------------------------<  119<H>  4.0   |  36<H>  |  1.01    Ca    9.2      20 Apr 2018 08:45  Mg     2.0     04-20    TPro  6.6  /  Alb  3.5  /  TBili  0.5  /  DBili  x   /  AST  14  /  ALT  15  /  AlkPhos  64  04-19        CARDIAC MARKERS ( 19 Apr 2018 08:38 )  x     / <0.01 ng/mL / 38 U/L / x     / 1.6 ng/mL  CARDIAC MARKERS ( 18 Apr 2018 18:54 )  x     / <0.01 ng/mL / x     / x     / x          Serum Pro-Brain Natriuretic Peptide: 2150 pg/mL (04-18 @ 18:54)      PHYSICAL EXAM:  Vital Signs Last 24 Hrs  T(C): 36.6 (20 Apr 2018 04:48), Max: 36.7 (19 Apr 2018 20:32)  T(F): 97.9 (20 Apr 2018 04:48), Max: 98 (19 Apr 2018 20:32)  HR: 64 (20 Apr 2018 04:48) (55 - 64)  BP: 161/71 (20 Apr 2018 04:48) (128/65 - 161/71)  BP(mean): --  RR: 18 (20 Apr 2018 04:48) (18 - 18)  SpO2: 93% (20 Apr 2018 04:48) (93% - 95%)    HEENT: Normal Oral mucosa, PERRL, EOMI  Lymphatic: No obvious lymphadenopathy, +BL LE edema  Cardiovascular: Normal S1S2, No JVD, 1/6 AYAKA, Peripheral pulses palpable 2+ B/L  Respiratory: Lungs clear to auscultation, normal effort  Gastrointestinal: Abdomen soft, ND, NT, +BS  Skin: Warm, dry, intact. No cyanosis, No rash.  Musculoskeletal: Normal ROM, normal strength  Psychiatric: Appropriate Mood and Affect      DIAGNOSTIC DATA  TELEMETRY: NSR 50-70s     RADIOLOGY:   < from: Xray Chest 2 Views PA/Lat (04.18.18 @ 20:14) >  FINDINGS:  The lungs are clear.  Trace bilateral pleural effusions.  No pneumothorax.  The cardiomediastinal silhouette is within normal limits.  Age indeterminant right posterior fourth and fifth rib fracture.        TTE : 2016    Conclusions:   1. Normal left ventricular size with normal systolic function.   2. Mild diastolic dysfunction.   3. Normal right ventricular size and function.   4. Mild left atrial enlargement.       ASSESSMENT AND PLAN:  This is a 63 year old male known to our office with HTN DM with historically normal LV function on TTE 2016 with no prior ischemic evaluation on file admitted with progressively worsening SOB and BL LE edema:     -- The patient has ruled out for acute coronary syndrome with negative serial cardiac enzymes  -- repeat TTE pending to assess LV function  -- c/w Lasix IV to keep net negative  -- c/w medical team - will get  abdominal u/s given abdominal fullness and start protonix  -- HTN - c/w Coreg and Cozaar   -- monitor platelets - no evidence bleeding  -- care per primary team   -- f/u with Dr Cespedes upon dc for cardiology    Kimberly Mckeon [facility]  O:  [phone]  P: [phone]

## 2018-04-21 DIAGNOSIS — R10.9 UNSPECIFIED ABDOMINAL PAIN: ICD-10-CM

## 2018-04-21 DIAGNOSIS — D69.6 THROMBOCYTOPENIA, UNSPECIFIED: ICD-10-CM

## 2018-04-21 LAB
ANION GAP SERPL CALC-SCNC: 6 MMOL/L — SIGNIFICANT CHANGE UP (ref 5–17)
BUN SERPL-MCNC: 18 MG/DL — SIGNIFICANT CHANGE UP (ref 7–23)
CALCIUM SERPL-MCNC: 8.8 MG/DL — SIGNIFICANT CHANGE UP (ref 8.4–10.5)
CHLORIDE SERPL-SCNC: 99 MMOL/L — SIGNIFICANT CHANGE UP (ref 96–108)
CO2 SERPL-SCNC: 36 MMOL/L — HIGH (ref 22–31)
CREAT SERPL-MCNC: 1.1 MG/DL — SIGNIFICANT CHANGE UP (ref 0.5–1.3)
GLUCOSE SERPL-MCNC: 170 MG/DL — HIGH (ref 70–99)
MAGNESIUM SERPL-MCNC: 2 MG/DL — SIGNIFICANT CHANGE UP (ref 1.6–2.6)
POTASSIUM SERPL-MCNC: 4.4 MMOL/L — SIGNIFICANT CHANGE UP (ref 3.5–5.3)
POTASSIUM SERPL-SCNC: 4.4 MMOL/L — SIGNIFICANT CHANGE UP (ref 3.5–5.3)
SODIUM SERPL-SCNC: 141 MMOL/L — SIGNIFICANT CHANGE UP (ref 135–145)

## 2018-04-21 PROCEDURE — 99233 SBSQ HOSP IP/OBS HIGH 50: CPT

## 2018-04-21 RX ORDER — ACETAMINOPHEN 500 MG
650 TABLET ORAL EVERY 6 HOURS
Qty: 0 | Refills: 0 | Status: DISCONTINUED | OUTPATIENT
Start: 2018-04-21 | End: 2018-04-25

## 2018-04-21 RX ADMIN — Medication 40 MILLIGRAM(S): at 05:19

## 2018-04-21 RX ADMIN — Medication 81 MILLIGRAM(S): at 12:37

## 2018-04-21 RX ADMIN — PANTOPRAZOLE SODIUM 40 MILLIGRAM(S): 20 TABLET, DELAYED RELEASE ORAL at 05:20

## 2018-04-21 RX ADMIN — CARVEDILOL PHOSPHATE 12.5 MILLIGRAM(S): 80 CAPSULE, EXTENDED RELEASE ORAL at 05:18

## 2018-04-21 RX ADMIN — ENOXAPARIN SODIUM 40 MILLIGRAM(S): 100 INJECTION SUBCUTANEOUS at 05:20

## 2018-04-21 RX ADMIN — INSULIN GLARGINE 5 UNIT(S): 100 INJECTION, SOLUTION SUBCUTANEOUS at 21:53

## 2018-04-21 RX ADMIN — CARVEDILOL PHOSPHATE 12.5 MILLIGRAM(S): 80 CAPSULE, EXTENDED RELEASE ORAL at 17:08

## 2018-04-21 RX ADMIN — LOSARTAN POTASSIUM 100 MILLIGRAM(S): 100 TABLET, FILM COATED ORAL at 05:18

## 2018-04-21 RX ADMIN — Medication 40 MILLIGRAM(S): at 17:08

## 2018-04-21 RX ADMIN — Medication 2: at 12:37

## 2018-04-21 NOTE — PROGRESS NOTE ADULT - PROBLEM SELECTOR PLAN 4
-BP labile.   -C/w losartan 100mg daily, coreg 12.5mg twice daily, and lasix. May need to add/uptitrate meds if BP remains persistently elevated.

## 2018-04-21 NOTE — PROGRESS NOTE ADULT - SUBJECTIVE AND OBJECTIVE BOX
pt seen and examined, no complaints on exam.     ROS otherwise    aspirin enteric coated 81 milliGRAM(s) Oral daily  carvedilol 12.5 milliGRAM(s) Oral every 12 hours  dextrose 50% Injectable 25 Gram(s) IV Push once  dextrose 50% Injectable 25 Gram(s) IV Push once  dextrose Gel 1 Dose(s) Oral once PRN  enoxaparin Injectable 40 milliGRAM(s) SubCutaneous every 24 hours  furosemide   Injectable 40 milliGRAM(s) IV Push two times a day  glucagon  Injectable 1 milliGRAM(s) IntraMuscular once PRN  insulin glargine Injectable (LANTUS) 5 Unit(s) SubCutaneous at bedtime  insulin lispro (HumaLOG) corrective regimen sliding scale   SubCutaneous three times a day before meals  insulin lispro (HumaLOG) corrective regimen sliding scale   SubCutaneous at bedtime  losartan 100 milliGRAM(s) Oral daily  pantoprazole    Tablet 40 milliGRAM(s) Oral before breakfast                            11.6   4.17  )-----------( 145      ( 20 Apr 2018 07:57 )             37.5       Hemoglobin: 11.6 g/dL (04-20 @ 07:57)  Hemoglobin: 12.6 g/dL (04-19 @ 10:31)  Hemoglobin: 13.4 g/dL (04-18 @ 18:54)      04-21    141  |  99  |  18  ----------------------------<  170<H>  4.4   |  36<H>  |  1.10    Ca    8.8      21 Apr 2018 05:39  Mg     2.0     04-21    TPro  6.6  /  Alb  3.5  /  TBili  0.5  /  DBili  x   /  AST  14  /  ALT  15  /  AlkPhos  64  04-19    Creatinine Trend: 1.10<--, 1.01<--, 0.89<--, 0.92<--    COAGS:     CARDIAC MARKERS ( 19 Apr 2018 08:38 )  x     / <0.01 ng/mL / 38 U/L / x     / 1.6 ng/mL  CARDIAC MARKERS ( 18 Apr 2018 18:54 )  x     / <0.01 ng/mL / x     / x     / x            T(C): 37.3 (04-21-18 @ 03:37), Max: 37.3 (04-21-18 @ 03:37)  HR: 64 (04-21-18 @ 03:37) (51 - 64)  BP: 145/78 (04-21-18 @ 03:37) (145/78 - 184/79)  RR: 18 (04-21-18 @ 03:37) (18 - 18)  SpO2: 94% (04-21-18 @ 03:37) (94% - 99%)  Wt(kg): --    I&O's Summary    19 Apr 2018 07:01  -  20 Apr 2018 07:00  --------------------------------------------------------  IN: 720 mL / OUT: 1000 mL / NET: -280 mL    20 Apr 2018 07:01  -  21 Apr 2018 06:38  --------------------------------------------------------  IN: 240 mL / OUT: 2600 mL / NET: -2360 mL      HEENT: Normal Oral mucosa, PERRL, EOMI  Lymphatic: No obvious lymphadenopathy, +BL LE edema  Cardiovascular: Normal S1S2, No JVD, 1/6 AYAKA, Peripheral pulses palpable 2+ B/L  Respiratory: Lungs clear to auscultation, normal effort  Gastrointestinal: Abdomen soft, ND, NT, +BS  Skin: Warm, dry, intact. No cyanosis, No rash.  Musculoskeletal: Normal ROM, normal strength  Psychiatric: Appropriate Mood and Affect      DIAGNOSTIC DATA  TELEMETRY: NSR 50-70s     RADIOLOGY:   < from: Xray Chest 2 Views PA/Lat (04.18.18 @ 20:14) >  FINDINGS:  The lungs are clear.  Trace bilateral pleural effusions.  No pneumothorax.  The cardiomediastinal silhouette is within normal limits.  Age indeterminant right posterior fourth and fifth rib fracture.        TTE : 2016    Conclusions:   1. Normal left ventricular size with normal systolic function.   2. Mild diastolic dysfunction.   3. Normal right ventricular size and function.   4. Mild left atrial enlargement.           ASSESSMENT AND PLAN:  This is a 63 year old male known to our office with HTN DM with historically normal LV function on TTE 2016 with no prior ischemic evaluation on file admitted with progressively worsening SOB and BL LE edema:     -- The patient has ruled out for acute coronary syndrome with negative serial cardiac enzymes  -- repeat TTE pending to assess LV function  -- c/w Lasix IV to keep net negative - Daily weights   -- HTN - c/w Coreg and Cozaar   -- monitor platelets - no evidence bleeding  -- care per primary team   -- f/u with Dr Cespedes upon dc for cardiology  D/W Dr Ryan

## 2018-04-21 NOTE — PROGRESS NOTE ADULT - SUBJECTIVE AND OBJECTIVE BOX
Patient is a 63y old  Male who presents with a chief complaint of shortness of breath and leg swelling (18 Apr 2018 21:28)        SUBJECTIVE / OVERNIGHT EVENTS: Patient reports lower extremity swelling better. He reports gas discomfort better. He reports some right lower back pain. He reports some mild right knee discomfort. He reports some LE dryness.       MEDICATIONS  (STANDING):  aspirin enteric coated 81 milliGRAM(s) Oral daily  carvedilol 12.5 milliGRAM(s) Oral every 12 hours  dextrose 50% Injectable 25 Gram(s) IV Push once  dextrose 50% Injectable 25 Gram(s) IV Push once  enoxaparin Injectable 40 milliGRAM(s) SubCutaneous every 24 hours  furosemide   Injectable 40 milliGRAM(s) IV Push two times a day  insulin glargine Injectable (LANTUS) 5 Unit(s) SubCutaneous at bedtime  insulin lispro (HumaLOG) corrective regimen sliding scale   SubCutaneous three times a day before meals  insulin lispro (HumaLOG) corrective regimen sliding scale   SubCutaneous at bedtime  losartan 100 milliGRAM(s) Oral daily  pantoprazole    Tablet 40 milliGRAM(s) Oral before breakfast    MEDICATIONS  (PRN):  acetaminophen   Tablet. 650 milliGRAM(s) Oral every 6 hours PRN Moderate Pain (4 - 6)  dextrose Gel 1 Dose(s) Oral once PRN Blood Glucose LESS THAN 70 milliGRAM(s)/deciliter  glucagon  Injectable 1 milliGRAM(s) IntraMuscular once PRN Glucose LESS THAN 70 milligrams/deciliter      Vital Signs Last 24 Hrs  T(C): 36.3 (21 Apr 2018 12:05), Max: 37.3 (21 Apr 2018 03:37)  T(F): 97.4 (21 Apr 2018 12:05), Max: 99.1 (21 Apr 2018 03:37)  HR: 61 (21 Apr 2018 18:34) (54 - 64)  BP: 133/63 (21 Apr 2018 18:34) (133/63 - 183/68)  BP(mean): --  RR: 18 (21 Apr 2018 12:05) (18 - 18)  SpO2: 96% (21 Apr 2018 12:05) (94% - 99%)  CAPILLARY BLOOD GLUCOSE      POCT Blood Glucose.: 141 mg/dL (21 Apr 2018 16:32)  POCT Blood Glucose.: 213 mg/dL (21 Apr 2018 11:41)  POCT Blood Glucose.: 129 mg/dL (21 Apr 2018 07:31)  POCT Blood Glucose.: 156 mg/dL (20 Apr 2018 21:02)    I&O's Summary    20 Apr 2018 07:01  -  21 Apr 2018 07:00  --------------------------------------------------------  IN: 240 mL / OUT: 2600 mL / NET: -2360 mL    21 Apr 2018 07:01  -  21 Apr 2018 20:44  --------------------------------------------------------  IN: 600 mL / OUT: 0 mL / NET: 600 mL          PHYSICAL EXAM:  GENERAL: NAD, well-developed  HEAD: Atraumatic, Normocephalic  EYES: EOMI, PERRLA, conjunctiva and sclera clear  NECK: Supple  CHEST/LUNG: Decreased BS in bases.   HEART: Regular rate and rhythm; No murmurs, rubs, or gallops  ABDOMEN: Soft, overweight, Nontender, Nondistended; Bowel sounds present  EXTREMITIES: 1-2+ edema in LE's.   PSYCH/Neuro: Awake and answers questions and follows commands. Moves extremities.   SKIN: Dry skin on LE's.      LABS:                        11.6   4.17  )-----------( 145      ( 20 Apr 2018 07:57 )             37.5     04-21    141  |  99  |  18  ----------------------------<  170<H>  4.4   |  36<H>  |  1.10    Ca    8.8      21 Apr 2018 05:39  Mg     2.0     04-21      < from: US Abdomen Complete (04.20.18 @ 15:02) >  IMPRESSION:     Normal size liver.    Cholelithiasis.    Bilateral pleural effusions.    < end of copied text >      Telemetry reviewed: Sinus 40-70's.       RADIOLOGY & ADDITIONAL TESTS:    Imaging Personally Reviewed: RUQ US report.   Consultant(s) Notes Reviewed:  Cardiology  Care Discussed with Consultants/Other Providers:

## 2018-04-21 NOTE — PROGRESS NOTE ADULT - PROBLEM SELECTOR PLAN 7
-C/w Lovenox for DVT PPx.  -OOB to chair and ambulate as tolerated.    8. Dispo: -DC planning soon once echo complete and on stable diuretic regimen.

## 2018-04-22 DIAGNOSIS — R53.83 OTHER FATIGUE: ICD-10-CM

## 2018-04-22 DIAGNOSIS — M54.5 LOW BACK PAIN: ICD-10-CM

## 2018-04-22 LAB
ALBUMIN SERPL ELPH-MCNC: 3.5 G/DL — SIGNIFICANT CHANGE UP (ref 3.3–5)
ALP SERPL-CCNC: 61 U/L — SIGNIFICANT CHANGE UP (ref 40–120)
ALT FLD-CCNC: 11 U/L — SIGNIFICANT CHANGE UP (ref 10–45)
ANION GAP SERPL CALC-SCNC: 12 MMOL/L — SIGNIFICANT CHANGE UP (ref 5–17)
AST SERPL-CCNC: 9 U/L — LOW (ref 10–40)
BILIRUB DIRECT SERPL-MCNC: <0.1 MG/DL — SIGNIFICANT CHANGE UP (ref 0–0.2)
BILIRUB INDIRECT FLD-MCNC: >0.4 MG/DL — SIGNIFICANT CHANGE UP (ref 0.2–1)
BILIRUB SERPL-MCNC: 0.5 MG/DL — SIGNIFICANT CHANGE UP (ref 0.2–1.2)
BUN SERPL-MCNC: 18 MG/DL — SIGNIFICANT CHANGE UP (ref 7–23)
CALCIUM SERPL-MCNC: 9.2 MG/DL — SIGNIFICANT CHANGE UP (ref 8.4–10.5)
CHLORIDE SERPL-SCNC: 94 MMOL/L — LOW (ref 96–108)
CO2 SERPL-SCNC: 36 MMOL/L — HIGH (ref 22–31)
CREAT SERPL-MCNC: 1.02 MG/DL — SIGNIFICANT CHANGE UP (ref 0.5–1.3)
GLUCOSE SERPL-MCNC: 97 MG/DL — SIGNIFICANT CHANGE UP (ref 70–99)
HCT VFR BLD CALC: 40.6 % — SIGNIFICANT CHANGE UP (ref 39–50)
HGB BLD-MCNC: 13.1 G/DL — SIGNIFICANT CHANGE UP (ref 13–17)
MAGNESIUM SERPL-MCNC: 2.2 MG/DL — SIGNIFICANT CHANGE UP (ref 1.6–2.6)
MCHC RBC-ENTMCNC: 26.9 PG — LOW (ref 27–34)
MCHC RBC-ENTMCNC: 32.3 GM/DL — SIGNIFICANT CHANGE UP (ref 32–36)
MCV RBC AUTO: 83.4 FL — SIGNIFICANT CHANGE UP (ref 80–100)
NT-PROBNP SERPL-SCNC: 1906 PG/ML — HIGH (ref 0–300)
PHOSPHATE SERPL-MCNC: 4 MG/DL — SIGNIFICANT CHANGE UP (ref 2.5–4.5)
PLATELET # BLD AUTO: 141 K/UL — LOW (ref 150–400)
POTASSIUM SERPL-MCNC: 4.2 MMOL/L — SIGNIFICANT CHANGE UP (ref 3.5–5.3)
POTASSIUM SERPL-SCNC: 4.2 MMOL/L — SIGNIFICANT CHANGE UP (ref 3.5–5.3)
PROT SERPL-MCNC: 6.8 G/DL — SIGNIFICANT CHANGE UP (ref 6–8.3)
RBC # BLD: 4.87 M/UL — SIGNIFICANT CHANGE UP (ref 4.2–5.8)
RBC # FLD: 14.6 % — HIGH (ref 10.3–14.5)
SODIUM SERPL-SCNC: 142 MMOL/L — SIGNIFICANT CHANGE UP (ref 135–145)
WBC # BLD: 4.62 K/UL — SIGNIFICANT CHANGE UP (ref 3.8–10.5)
WBC # FLD AUTO: 4.62 K/UL — SIGNIFICANT CHANGE UP (ref 3.8–10.5)

## 2018-04-22 PROCEDURE — 99233 SBSQ HOSP IP/OBS HIGH 50: CPT

## 2018-04-22 RX ORDER — SPIRONOLACTONE 25 MG/1
25 TABLET, FILM COATED ORAL DAILY
Qty: 0 | Refills: 0 | Status: DISCONTINUED | OUTPATIENT
Start: 2018-04-22 | End: 2018-04-25

## 2018-04-22 RX ORDER — CARVEDILOL PHOSPHATE 80 MG/1
6.25 CAPSULE, EXTENDED RELEASE ORAL EVERY 12 HOURS
Qty: 0 | Refills: 0 | Status: DISCONTINUED | OUTPATIENT
Start: 2018-04-22 | End: 2018-04-25

## 2018-04-22 RX ADMIN — Medication 40 MILLIGRAM(S): at 17:55

## 2018-04-22 RX ADMIN — Medication 81 MILLIGRAM(S): at 12:30

## 2018-04-22 RX ADMIN — INSULIN GLARGINE 5 UNIT(S): 100 INJECTION, SOLUTION SUBCUTANEOUS at 21:48

## 2018-04-22 RX ADMIN — SPIRONOLACTONE 25 MILLIGRAM(S): 25 TABLET, FILM COATED ORAL at 12:34

## 2018-04-22 RX ADMIN — Medication 1: at 12:29

## 2018-04-22 RX ADMIN — CARVEDILOL PHOSPHATE 6.25 MILLIGRAM(S): 80 CAPSULE, EXTENDED RELEASE ORAL at 17:56

## 2018-04-22 RX ADMIN — LOSARTAN POTASSIUM 100 MILLIGRAM(S): 100 TABLET, FILM COATED ORAL at 05:09

## 2018-04-22 RX ADMIN — CARVEDILOL PHOSPHATE 12.5 MILLIGRAM(S): 80 CAPSULE, EXTENDED RELEASE ORAL at 05:09

## 2018-04-22 RX ADMIN — Medication 40 MILLIGRAM(S): at 05:09

## 2018-04-22 RX ADMIN — ENOXAPARIN SODIUM 40 MILLIGRAM(S): 100 INJECTION SUBCUTANEOUS at 05:10

## 2018-04-22 RX ADMIN — PANTOPRAZOLE SODIUM 40 MILLIGRAM(S): 20 TABLET, DELAYED RELEASE ORAL at 05:10

## 2018-04-22 NOTE — PROGRESS NOTE ADULT - PROBLEM SELECTOR PLAN 7
-Possibly secondary to Coreg; will reduce to 6.25mg twice daily in view of fatigue and persistent sinus bradycardia.   -TFT's within normal limits.   -Will check B12, folate, iron studies, and am cortisol.  -Possible component of undiagnosed JOEY causing daytime fatigue.

## 2018-04-22 NOTE — PROGRESS NOTE ADULT - SUBJECTIVE AND OBJECTIVE BOX
S: no chest pain or sob       acetaminophen   Tablet. 650 milliGRAM(s) Oral every 6 hours PRN  aspirin enteric coated 81 milliGRAM(s) Oral daily  carvedilol 6.25 milliGRAM(s) Oral every 12 hours  dextrose 50% Injectable 25 Gram(s) IV Push once  dextrose 50% Injectable 25 Gram(s) IV Push once  dextrose Gel 1 Dose(s) Oral once PRN  enoxaparin Injectable 40 milliGRAM(s) SubCutaneous every 24 hours  furosemide   Injectable 40 milliGRAM(s) IV Push two times a day  glucagon  Injectable 1 milliGRAM(s) IntraMuscular once PRN  insulin glargine Injectable (LANTUS) 5 Unit(s) SubCutaneous at bedtime  insulin lispro (HumaLOG) corrective regimen sliding scale   SubCutaneous three times a day before meals  insulin lispro (HumaLOG) corrective regimen sliding scale   SubCutaneous at bedtime  losartan 100 milliGRAM(s) Oral daily  pantoprazole    Tablet 40 milliGRAM(s) Oral before breakfast  spironolactone 25 milliGRAM(s) Oral daily                            13.1   4.62  )-----------( 141      ( 22 Apr 2018 08:27 )             40.6       04-22    142  |  94<L>  |  18  ----------------------------<  97  4.2   |  36<H>  |  1.02    Ca    9.2      22 Apr 2018 06:38  Phos  4.0     04-22  Mg     2.2     04-22    TPro  6.8  /  Alb  3.5  /  TBili  0.5  /  DBili  <0.1  /  AST  9<L>  /  ALT  11  /  AlkPhos  61  04-22            T(C): 36.4 (04-22-18 @ 09:12), Max: 36.8 (04-21-18 @ 21:28)  HR: 60 (04-22-18 @ 09:12) (60 - 69)  BP: 128/67 (04-22-18 @ 09:12) (128/67 - 175/74)  RR: 18 (04-22-18 @ 09:12) (18 - 19)  SpO2: 97% (04-22-18 @ 09:12) (92% - 97%)  Wt(kg): --    I&O's Summary    21 Apr 2018 07:01  -  22 Apr 2018 07:00  --------------------------------------------------------  IN: 840 mL / OUT: 0 mL / NET: 840 mL      Heart: normal S1, S2, RRR, no m/r/g  Lungs: cta b/l  Abd: soft nT, nD  Ext: + edema in LE bilaterally       DIAGNOSTIC DATA  TELEMETRY: NSR 40-60     RADIOLOGY:   < from: Xray Chest 2 Views PA/Lat (04.18.18 @ 20:14) >  FINDINGS:  The lungs are clear.  Trace bilateral pleural effusions.  No pneumothorax.  The cardiomediastinal silhouette is within normal limits.  Age indeterminant right posterior fourth and fifth rib fracture.        TTE : 2016    Conclusions:   1. Normal left ventricular size with normal systolic function.   2. Mild diastolic dysfunction.   3. Normal right ventricular size and function.   4. Mild left atrial enlargement.           ASSESSMENT AND PLAN:  This is a 63 year old male known to our office with HTN DM with historically normal LV function on TTE 2016 with no prior ischemic evaluation on file admitted with progressively worsening SOB and BL LE edema:     -MI ruled out   -continue with lasix to keep O > I  -check TTE to assess LV function  -further workup including ischemic eval pending above    Sergio Ryan MD

## 2018-04-22 NOTE — PROGRESS NOTE ADULT - PROBLEM SELECTOR PLAN 9
-C/w Lovenox for DVT PPx.  -OOB to chair and ambulate as tolerated.    10. Dispo: -DC planning soon once echo complete and on stable diuretic regimen and if no further inpatient cardiac workup/management planned.

## 2018-04-22 NOTE — PROGRESS NOTE ADULT - PROBLEM SELECTOR PLAN 4
-BP labile.  -C/w losartan 100mg daily, reduced coreg to 6.25mg twice daily (in view of fatigue), and lasix.  -Started spironolactone 25mg daily in view of CHF and HTN not yet well controlled.  -Vitals Q4H.

## 2018-04-22 NOTE — PROGRESS NOTE ADULT - SUBJECTIVE AND OBJECTIVE BOX
Patient is a 63y old  Male who presents with a chief complaint of shortness of breath and leg swelling (18 Apr 2018 21:28)        SUBJECTIVE / OVERNIGHT EVENTS: Patient reports that he feels better. Says LE swelling better and SOB better. Has chronic lower back pain. Reports chronic fatigue and chronic intermittent feelings of being very cold at nighttime.       MEDICATIONS  (STANDING):  aspirin enteric coated 81 milliGRAM(s) Oral daily  carvedilol 6.25 milliGRAM(s) Oral every 12 hours  dextrose 50% Injectable 25 Gram(s) IV Push once  dextrose 50% Injectable 25 Gram(s) IV Push once  enoxaparin Injectable 40 milliGRAM(s) SubCutaneous every 24 hours  furosemide   Injectable 40 milliGRAM(s) IV Push two times a day  insulin glargine Injectable (LANTUS) 5 Unit(s) SubCutaneous at bedtime  insulin lispro (HumaLOG) corrective regimen sliding scale   SubCutaneous three times a day before meals  insulin lispro (HumaLOG) corrective regimen sliding scale   SubCutaneous at bedtime  losartan 100 milliGRAM(s) Oral daily  pantoprazole    Tablet 40 milliGRAM(s) Oral before breakfast  spironolactone 25 milliGRAM(s) Oral daily    MEDICATIONS  (PRN):  acetaminophen   Tablet. 650 milliGRAM(s) Oral every 6 hours PRN Moderate Pain (4 - 6)  dextrose Gel 1 Dose(s) Oral once PRN Blood Glucose LESS THAN 70 milliGRAM(s)/deciliter  glucagon  Injectable 1 milliGRAM(s) IntraMuscular once PRN Glucose LESS THAN 70 milligrams/deciliter      Vital Signs Last 24 Hrs  T(C): 36.4 (22 Apr 2018 09:12), Max: 36.8 (21 Apr 2018 21:28)  T(F): 97.6 (22 Apr 2018 09:12), Max: 98.2 (21 Apr 2018 21:28)  HR: 60 (22 Apr 2018 09:12) (60 - 69)  BP: 128/67 (22 Apr 2018 09:12) (128/67 - 169/78)  BP(mean): --  RR: 18 (22 Apr 2018 09:12) (18 - 19)  SpO2: 97% (22 Apr 2018 09:12) (92% - 97%)  CAPILLARY BLOOD GLUCOSE      POCT Blood Glucose.: 136 mg/dL (22 Apr 2018 16:42)  POCT Blood Glucose.: 186 mg/dL (22 Apr 2018 12:02)  POCT Blood Glucose.: 115 mg/dL (22 Apr 2018 07:45)  POCT Blood Glucose.: 179 mg/dL (21 Apr 2018 21:42)    I&O's Summary    21 Apr 2018 07:01  -  22 Apr 2018 07:00  --------------------------------------------------------  IN: 840 mL / OUT: 0 mL / NET: 840 mL    22 Apr 2018 07:01  -  22 Apr 2018 17:46  --------------------------------------------------------  IN: 0 mL / OUT: 775 mL / NET: -775 mL          PHYSICAL EXAM:  GENERAL: NAD, well-developed  HEAD: Atraumatic, Normocephalic  EYES: EOMI, PERRLA, conjunctiva and sclera clear  NECK: Supple  CHEST/LUNG: Decreased BS in bases.   HEART: Regular rate and rhythm; No murmurs, rubs, or gallops  ABDOMEN: Soft, obese, Nontender, Nondistended; Bowel sounds present  EXTREMITIES: 1+ edema in LE's.   PSYCH/Neuro: AAOx3. Non-focal.   SKIN: Dry skin on LE's.       LABS:                        13.1   4.62  )-----------( 141      ( 22 Apr 2018 08:27 )             40.6     04-22    142  |  94<L>  |  18  ----------------------------<  97  4.2   |  36<H>  |  1.02    Ca    9.2      22 Apr 2018 06:38  Phos  4.0     04-22  Mg     2.2     04-22    TPro  6.8  /  Alb  3.5  /  TBili  0.5  /  DBili  <0.1  /  AST  9<L>  /  ALT  11  /  AlkPhos  61  04-22          Telemetry reviewed: Sinus 40-60's.     RADIOLOGY & ADDITIONAL TESTS:    Imaging Personally Reviewed:  Consultant(s) Notes Reviewed:  Cardiology  Care Discussed with Consultants/Other Providers:

## 2018-04-22 NOTE — PROGRESS NOTE ADULT - PROBLEM SELECTOR PLAN 8
-Patient reports chronic intermittent right lower back pain.   -Will get lumbar spine X-ray.  -C/w tylenol as needed for pain.

## 2018-04-23 LAB
ANION GAP SERPL CALC-SCNC: 5 MMOL/L — SIGNIFICANT CHANGE UP (ref 5–17)
BUN SERPL-MCNC: 21 MG/DL — SIGNIFICANT CHANGE UP (ref 7–23)
CALCIUM SERPL-MCNC: 9 MG/DL — SIGNIFICANT CHANGE UP (ref 8.4–10.5)
CHLORIDE SERPL-SCNC: 96 MMOL/L — SIGNIFICANT CHANGE UP (ref 96–108)
CO2 SERPL-SCNC: 38 MMOL/L — HIGH (ref 22–31)
CORTIS AM PEAK SERPL-MCNC: 9.9 UG/DL — SIGNIFICANT CHANGE UP (ref 6–18.4)
CREAT SERPL-MCNC: 0.97 MG/DL — SIGNIFICANT CHANGE UP (ref 0.5–1.3)
FERRITIN SERPL-MCNC: 39 NG/ML — SIGNIFICANT CHANGE UP (ref 30–400)
FOLATE SERPL-MCNC: 9.1 NG/ML — SIGNIFICANT CHANGE UP
GLUCOSE BLDC GLUCOMTR-MCNC: 154 MG/DL — HIGH (ref 70–99)
GLUCOSE BLDC GLUCOMTR-MCNC: 156 MG/DL — HIGH (ref 70–99)
GLUCOSE BLDC GLUCOMTR-MCNC: 195 MG/DL — HIGH (ref 70–99)
GLUCOSE BLDC GLUCOMTR-MCNC: 96 MG/DL — SIGNIFICANT CHANGE UP (ref 70–99)
GLUCOSE SERPL-MCNC: 115 MG/DL — HIGH (ref 70–99)
HCT VFR BLD CALC: 40 % — SIGNIFICANT CHANGE UP (ref 39–50)
HGB BLD-MCNC: 12.4 G/DL — LOW (ref 13–17)
IRON SATN MFR SERPL: 12 % — LOW (ref 16–55)
IRON SATN MFR SERPL: 41 UG/DL — LOW (ref 45–165)
MCHC RBC-ENTMCNC: 26.1 PG — LOW (ref 27–34)
MCHC RBC-ENTMCNC: 31 GM/DL — LOW (ref 32–36)
MCV RBC AUTO: 84.2 FL — SIGNIFICANT CHANGE UP (ref 80–100)
PLATELET # BLD AUTO: 136 K/UL — LOW (ref 150–400)
POTASSIUM SERPL-MCNC: 4.3 MMOL/L — SIGNIFICANT CHANGE UP (ref 3.5–5.3)
POTASSIUM SERPL-SCNC: 4.3 MMOL/L — SIGNIFICANT CHANGE UP (ref 3.5–5.3)
RBC # BLD: 4.75 M/UL — SIGNIFICANT CHANGE UP (ref 4.2–5.8)
RBC # FLD: 14.7 % — HIGH (ref 10.3–14.5)
SODIUM SERPL-SCNC: 139 MMOL/L — SIGNIFICANT CHANGE UP (ref 135–145)
TIBC SERPL-MCNC: 333 UG/DL — SIGNIFICANT CHANGE UP (ref 220–430)
UIBC SERPL-MCNC: 292 UG/DL — SIGNIFICANT CHANGE UP (ref 110–370)
VIT B12 SERPL-MCNC: 472 PG/ML — SIGNIFICANT CHANGE UP (ref 232–1245)
WBC # BLD: 4.27 K/UL — SIGNIFICANT CHANGE UP (ref 3.8–10.5)
WBC # FLD AUTO: 4.27 K/UL — SIGNIFICANT CHANGE UP (ref 3.8–10.5)

## 2018-04-23 PROCEDURE — 72100 X-RAY EXAM L-S SPINE 2/3 VWS: CPT | Mod: 26

## 2018-04-23 PROCEDURE — 93306 TTE W/DOPPLER COMPLETE: CPT | Mod: 26

## 2018-04-23 PROCEDURE — 99233 SBSQ HOSP IP/OBS HIGH 50: CPT

## 2018-04-23 RX ADMIN — Medication 1: at 12:38

## 2018-04-23 RX ADMIN — SPIRONOLACTONE 25 MILLIGRAM(S): 25 TABLET, FILM COATED ORAL at 06:29

## 2018-04-23 RX ADMIN — CARVEDILOL PHOSPHATE 6.25 MILLIGRAM(S): 80 CAPSULE, EXTENDED RELEASE ORAL at 17:18

## 2018-04-23 RX ADMIN — Medication 650 MILLIGRAM(S): at 21:04

## 2018-04-23 RX ADMIN — ENOXAPARIN SODIUM 40 MILLIGRAM(S): 100 INJECTION SUBCUTANEOUS at 06:29

## 2018-04-23 RX ADMIN — Medication 81 MILLIGRAM(S): at 12:38

## 2018-04-23 RX ADMIN — Medication 1: at 17:18

## 2018-04-23 RX ADMIN — Medication 40 MILLIGRAM(S): at 17:18

## 2018-04-23 RX ADMIN — INSULIN GLARGINE 5 UNIT(S): 100 INJECTION, SOLUTION SUBCUTANEOUS at 21:18

## 2018-04-23 RX ADMIN — CARVEDILOL PHOSPHATE 6.25 MILLIGRAM(S): 80 CAPSULE, EXTENDED RELEASE ORAL at 06:29

## 2018-04-23 RX ADMIN — PANTOPRAZOLE SODIUM 40 MILLIGRAM(S): 20 TABLET, DELAYED RELEASE ORAL at 06:29

## 2018-04-23 RX ADMIN — Medication 40 MILLIGRAM(S): at 06:29

## 2018-04-23 RX ADMIN — Medication 650 MILLIGRAM(S): at 22:54

## 2018-04-23 RX ADMIN — LOSARTAN POTASSIUM 100 MILLIGRAM(S): 100 TABLET, FILM COATED ORAL at 06:29

## 2018-04-23 NOTE — PROGRESS NOTE ADULT - SUBJECTIVE AND OBJECTIVE BOX
pt seen and examined, no complaints on exam.     acetaminophen   Tablet. 650 milliGRAM(s) Oral every 6 hours PRN  aspirin enteric coated 81 milliGRAM(s) Oral daily  carvedilol 6.25 milliGRAM(s) Oral every 12 hours  dextrose 50% Injectable 25 Gram(s) IV Push once  dextrose 50% Injectable 25 Gram(s) IV Push once  dextrose Gel 1 Dose(s) Oral once PRN  enoxaparin Injectable 40 milliGRAM(s) SubCutaneous every 24 hours  furosemide   Injectable 40 milliGRAM(s) IV Push two times a day  glucagon  Injectable 1 milliGRAM(s) IntraMuscular once PRN  insulin glargine Injectable (LANTUS) 5 Unit(s) SubCutaneous at bedtime  insulin lispro (HumaLOG) corrective regimen sliding scale   SubCutaneous three times a day before meals  insulin lispro (HumaLOG) corrective regimen sliding scale   SubCutaneous at bedtime  losartan 100 milliGRAM(s) Oral daily  pantoprazole    Tablet 40 milliGRAM(s) Oral before breakfast  spironolactone 25 milliGRAM(s) Oral daily                            13.1   4.62  )-----------( 141      ( 22 Apr 2018 08:27 )             40.6       Hemoglobin: 13.1 g/dL (04-22 @ 08:27)  Hemoglobin: 11.6 g/dL (04-20 @ 07:57)  Hemoglobin: 12.6 g/dL (04-19 @ 10:31)  Hemoglobin: 13.4 g/dL (04-18 @ 18:54)      04-22    142  |  94<L>  |  18  ----------------------------<  97  4.2   |  36<H>  |  1.02    Ca    9.2      22 Apr 2018 06:38  Phos  4.0     04-22  Mg     2.2     04-22    TPro  6.8  /  Alb  3.5  /  TBili  0.5  /  DBili  <0.1  /  AST  9<L>  /  ALT  11  /  AlkPhos  61  04-22    Creatinine Trend: 1.02<--, 1.10<--, 1.01<--, 0.89<--, 0.92<--    COAGS:           T(C): 36.7 (04-23-18 @ 04:24), Max: 36.7 (04-23-18 @ 04:24)  HR: 65 (04-23-18 @ 04:24) (60 - 71)  BP: 130/61 (04-23-18 @ 04:24) (125/65 - 163/70)  RR: 18 (04-23-18 @ 04:24) (17 - 18)  SpO2: 93% (04-23-18 @ 04:24) (92% - 97%)  Wt(kg): --    I&O's Summary    21 Apr 2018 07:01  -  22 Apr 2018 07:00  --------------------------------------------------------  IN: 840 mL / OUT: 0 mL / NET: 840 mL    22 Apr 2018 07:01  -  23 Apr 2018 05:11  --------------------------------------------------------  IN: 0 mL / OUT: 775 mL / NET: -775 mL        HEENT: Normal Oral mucosa, PERRL, EOMI  Lymphatic: No obvious lymphadenopathy, +BL LE edema  Cardiovascular: Normal S1S2, No JVD, 1/6 AYAKA, Peripheral pulses palpable 2+ B/L  Respiratory: Lungs clear to auscultation, normal effort  Gastrointestinal: Abdomen soft, ND, NT, +BS  Skin: Warm, dry, intact. No cyanosis, No rash.  Musculoskeletal: Normal ROM, normal strength  Psychiatric: Appropriate Mood and Affect      DIAGNOSTIC DATA  TELEMETRY: NSR 50-70s     RADIOLOGY:   < from: Xray Chest 2 Views PA/Lat (04.18.18 @ 20:14) >  FINDINGS:  The lungs are clear.  Trace bilateral pleural effusions.  No pneumothorax.  The cardiomediastinal silhouette is within normal limits.  Age indeterminant right posterior fourth and fifth rib fracture.        TTE : 2016    Conclusions:   1. Normal left ventricular size with normal systolic function.   2. Mild diastolic dysfunction.   3. Normal right ventricular size and function.   4. Mild left atrial enlargement.           ASSESSMENT AND PLAN:  This is a 63 year old male known to our office with HTN DM with historically normal LV function on TTE 2016 with no prior ischemic evaluation on file admitted with progressively worsening SOB and BL LE edema:     -- The patient has ruled out for acute coronary syndrome with negative serial cardiac enzymes  -- repeat TTE pending to assess LV function  -- c/w Lasix IV to keep net negative - Daily weights   -- HTN - c/w Coreg and Cozaar   --  GI / DVT prophylaxis.  -- Keep K>4, mag >2.0   -- care per primary team   -- f/u with Dr Cespedes upon dc for cardiology  D/W Dr Ryan

## 2018-04-23 NOTE — DISCHARGE NOTE ADULT - FINDINGS/TREATMENT
Normal Study  * Chest Pain: No chest pain with administration of  Regadenoson.  * Symptom: Shortness of breath.  * HR Response: Appropriate.  * BP Response: Appropriate.  * Heart Rhythm: Sinus Bradycardia - 57 BPM.  * Baseline ECG: Nonspecific ST-T wave abnormality.  * ECG Changes: No significant ischemic changes.  * Arrhythmia: None.  * Review of raw data shows: The study is of good technical  quality.  * The left ventricle was normal in size. There are medium  sized, mild defects in inferior and inferoseptal walls  that are fixed, correct with prone imaging, and have  normal wall motion suggestive of diaphragmatic attenuation  artifact.  * Post-stress gated wall motion analysis was performed  (LVEF = 60 %;LVEDV = 96 ml.), revealing normal LV  function. 1. Mitral annular calcification, otherwise normal mitral  valve. Minimal mitral regurgitation.  2. Thickened aortic valve.  3. Endocardium not well visualized; grossly normal left  ventricular systolic function.  4. Normal right ventricular size and function.  EF - 63%

## 2018-04-23 NOTE — DISCHARGE NOTE ADULT - HOSPITAL COURSE
64 yo M w/ DM2 & HTN presented with shortness of breath and leg swelling for 2-years which has gotten worse recently limiting his mobility. Patient was admitted to [facility] for monitor and seen by cardiology team during hospital course. Patient reported normal echo 2 years ago. Patient was noted to have LE edema, pleural effusion on CXR and mildly elevated BNP. Patient was started on Lasix for diuresis . Echo did not show any evidence of LV/RV dysfunction or significant valvular abnormalities. Nuc stress test was also performed as per card rec which did not show any significant findings. Of note patient's Coreg was lowered due to bradycardia and fatigue issues. 64 yo M w/ DM2 & HTN presented with shortness of breath and leg swelling for 2-years which has gotten worse recently limiting his mobility. Patient was admitted to [facility] for monitor and seen by cardiology team during hospital course. Patient reported normal echo 2 years ago. Patient was noted to have LE edema, pleural effusion on CXR and mildly elevated BNP. Patient was started on Lasix for diuresis . Echo did not show any evidence of LV/RV dysfunction or significant valvular abnormalities. Nuc stress test was also performed as per card rec which did not show any significant findings. Of note patient's Coreg was lowered due to bradycardia and fatigue issues.

## 2018-04-23 NOTE — DISCHARGE NOTE ADULT - PATIENT PORTAL LINK FT
You can access the [email or web address] Portal, offered by [facility], by registering with the following website: [email or web address]

## 2018-04-23 NOTE — PROGRESS NOTE ADULT - SUBJECTIVE AND OBJECTIVE BOX
Patient is a 63y old  Male who presents with a chief complaint of shortness of breath and leg swelling (23 Apr 2018 12:18)        SUBJECTIVE / OVERNIGHT EVENTS:  c/o foot pain unable to give clear details of pain.   Denies SOB, CP. +edema has improved.    Afebrile.     MEDICATIONS  (STANDING):  aspirin enteric coated 81 milliGRAM(s) Oral daily  carvedilol 6.25 milliGRAM(s) Oral every 12 hours  dextrose 50% Injectable 25 Gram(s) IV Push once  dextrose 50% Injectable 25 Gram(s) IV Push once  enoxaparin Injectable 40 milliGRAM(s) SubCutaneous every 24 hours  furosemide   Injectable 40 milliGRAM(s) IV Push two times a day  insulin glargine Injectable (LANTUS) 5 Unit(s) SubCutaneous at bedtime  insulin lispro (HumaLOG) corrective regimen sliding scale   SubCutaneous three times a day before meals  insulin lispro (HumaLOG) corrective regimen sliding scale   SubCutaneous at bedtime  losartan 100 milliGRAM(s) Oral daily  pantoprazole    Tablet 40 milliGRAM(s) Oral before breakfast  spironolactone 25 milliGRAM(s) Oral daily    MEDICATIONS  (PRN):  acetaminophen   Tablet. 650 milliGRAM(s) Oral every 6 hours PRN Moderate Pain (4 - 6)  dextrose Gel 1 Dose(s) Oral once PRN Blood Glucose LESS THAN 70 milliGRAM(s)/deciliter  glucagon  Injectable 1 milliGRAM(s) IntraMuscular once PRN Glucose LESS THAN 70 milligrams/deciliter      Vital Signs Last 24 Hrs  T(C): 36.3 (23 Apr 2018 12:34), Max: 36.7 (23 Apr 2018 04:24)  T(F): 97.3 (23 Apr 2018 12:34), Max: 98 (23 Apr 2018 04:24)  HR: 52 (23 Apr 2018 12:34) (52 - 71)  BP: 172/78 (23 Apr 2018 12:34) (125/65 - 172/78)  BP(mean): --  RR: 18 (23 Apr 2018 12:34) (18 - 18)  SpO2: 97% (23 Apr 2018 12:34) (92% - 97%)  CAPILLARY BLOOD GLUCOSE      POCT Blood Glucose.: 195 mg/dL (23 Apr 2018 12:14)  POCT Blood Glucose.: 96 mg/dL (23 Apr 2018 09:04)  POCT Blood Glucose.: 188 mg/dL (22 Apr 2018 21:43)  POCT Blood Glucose.: 136 mg/dL (22 Apr 2018 16:42)    I&O's Summary    22 Apr 2018 07:01  -  23 Apr 2018 07:00  --------------------------------------------------------  IN: 250 mL / OUT: 775 mL / NET: -525 mL    23 Apr 2018 07:01  -  23 Apr 2018 13:58  --------------------------------------------------------  IN: 480 mL / OUT: 0 mL / NET: 480 mL          PHYSICAL EXAM  GENERAL: NAD, well-developed  HEAD:  Atraumatic, Normocephalic  EYES: EOMI,  conjunctiva and sclera clear  NECK: Supple, No JVD  CHEST/LUNG: Clear to auscultation bilaterally; No wheeze  HEART: Regular rate and rhythm; No murmurs, rubs, or gallops  ABDOMEN: Soft, Nontender, Nondistended; Bowel sounds present  EXTREMITIES:  2+ Peripheral Pulses, bilateral LE edema, pitting.   PSYCH: AAOx3  SKIN: No rashes or lesions    LABS:                        12.4   4.27  )-----------( 136      ( 23 Apr 2018 09:32 )             40.0     04-23    139  |  96  |  21  ----------------------------<  115<H>  4.3   |  38<H>  |  0.97    Ca    9.0      23 Apr 2018 07:07  Phos  4.0     04-22  Mg     2.2     04-22    TPro  6.8  /  Alb  3.5  /  TBili  0.5  /  DBili  <0.1  /  AST  9<L>  /  ALT  11  /  AlkPhos  61  04-22                RADIOLOGY & ADDITIONAL TESTS:    Imaging Personally Reviewed:  Consultant(s) Notes Reviewed:  card  Care Discussed with Consultants/Other Providers:

## 2018-04-23 NOTE — PROGRESS NOTE ADULT - PROBLEM SELECTOR PLAN 4
-C/w losartan 100mg daily, coreg to 6.25mg twice daily (in view of fatigue), and lasix/spironolactone

## 2018-04-23 NOTE — DISCHARGE NOTE ADULT - CARE PROVIDER_API CALL
Liliana Cespedes), Interventional Cardiology  [address]  [place] [address]  Phone: [phone]  Fax: [phone] Liliana Cespedes), Interventional Cardiology  [address]  [place] [address]  Phone: [phone]  Fax: [phone]

## 2018-04-23 NOTE — DISCHARGE NOTE ADULT - PLAN OF CARE
Symptom resolved Weigh yourself daily.  If you gain 3lbs in 3 days, or 5lbs in a week call your Health Care Provider.  Do not eat or drink foods containing more than 2000mg of salt (sodium) in your diet every day.  Call your Health Care Provider if you have any swelling or increased swelling in your feet, ankles, and/or stomach.  Take all of your medication as directed.  If you become dizzy call your Health Care Provider. Low salt diet  Activity as tolerated.  Take all medication as prescribed.  Follow up with your medical doctor for routine blood pressure monitoring at your next visit.  Notify your doctor if you have any of the following symptoms:   Dizziness, Lightheadedness, Blurry vision, Headache, Chest pain, Shortness of breath improved symptoms. You had extensive cardiac work up including echo and stress test .  You did not have any evidence of congestive heart failure on echocardiogram and stress test was neg.   Please cont all your meds as prescribed and follow up with your PMD/Card as outpt in 1-2 weeks.   If any worsening symptoms of SOB or swelling notify your doctor or come back to hospital.   Monitor your weight daily and if sudden changes notify your doctors. Cont with your meds as prescribed. Your A1c was 7.7.  Cont healthy lifestyle and weight loss.   F/u with your PMD for BS check and A1c Cont with your blood pressure meds as prescribed and follow up for BP check at office You had extensive cardiac work up including echo and stress test .  You did not have any evidence of congestive heart failure on echocardiogram and stress test was neg.   Please continue all your meds as prescribed and follow up with your PMD/Card as outpt in 1-2 weeks.   If any worsening symptoms of SOB or swelling notify your doctor or come back to hospital.   Monitor your weight daily and if sudden changes notify your doctors.

## 2018-04-23 NOTE — DISCHARGE NOTE ADULT - MEDICATION SUMMARY - MEDICATIONS TO TAKE
I will START or STAY ON the medications listed below when I get home from the hospital:    spironolactone 25 mg oral tablet  -- 1 tab(s) by mouth once a day  -- Indication: For Water pill    aspirin 81 mg oral delayed release tablet  -- 1 tab(s) by mouth once a day  -- Indication: For Cardiac protectionand stroke prevention    losartan 100 mg oral tablet  -- 1 tab(s) by mouth once a day  -- Indication: For Blood pressure control    metFORMIN 1000 mg oral tablet  -- 1 tab(s) by mouth 2 times a day  -- Indication: For Blood sugar control    Januvia 25 mg oral tablet  -- 1 tab(s) by mouth once a day  -- Indication: For Blood sugar control    glipiZIDE 2.5 mg oral tablet, extended release  -- 1 tab(s) by mouth once a day  -- Indication: For Blood sugar control    carvedilol 6.25 mg oral tablet  -- 1 tab(s) by mouth every 12 hours  -- Indication: For Heart rate control    furosemide 40 mg oral tablet  -- 1 tab(s) by mouth 2 times a day  -- Indication: For water pill    pantoprazole 40 mg oral delayed release tablet  -- 1 tab(s) by mouth once a day (before a meal)  -- Indication: For Stomach protection

## 2018-04-23 NOTE — DISCHARGE NOTE ADULT - CARE PLAN
Principal Discharge DX:	CHF (congestive heart failure), NYHA class I  Goal:	Symptom resolved  Assessment and plan of treatment:	Weigh yourself daily.  If you gain 3lbs in 3 days, or 5lbs in a week call your Health Care Provider.  Do not eat or drink foods containing more than 2000mg of salt (sodium) in your diet every day.  Call your Health Care Provider if you have any swelling or increased swelling in your feet, ankles, and/or stomach.  Take all of your medication as directed.  If you become dizzy call your Health Care Provider.  Secondary Diagnosis:	Essential hypertension  Assessment and plan of treatment:	Low salt diet  Activity as tolerated.  Take all medication as prescribed.  Follow up with your medical doctor for routine blood pressure monitoring at your next visit.  Notify your doctor if you have any of the following symptoms:   Dizziness, Lightheadedness, Blurry vision, Headache, Chest pain, Shortness of breath  Secondary Diagnosis:	Type 2 diabetes mellitus without complication, without long-term current use of insulin  Assessment and plan of treatment:	Low salt diet  Activity as tolerated.  Take all medication as prescribed.  Follow up with your medical doctor for routine blood pressure monitoring at your next visit.  Notify your doctor if you have any of the following symptoms:   Dizziness, Lightheadedness, Blurry vision, Headache, Chest pain, Shortness of breath Principal Discharge DX:	Edema, unspecified type  Goal:	improved symptoms.  Assessment and plan of treatment:	You had extensive cardiac work up including echo and stress test .  You did not have any evidence of congestive heart failure on echocardiogram and stress test was neg.   Please cont all your meds as prescribed and follow up with your PMD/Card as outpt in 1-2 weeks.   If any worsening symptoms of SOB or swelling notify your doctor or come back to hospital.   Monitor your weight daily and if sudden changes notify your doctors.  Secondary Diagnosis:	Type 2 diabetes mellitus without complication, without long-term current use of insulin  Assessment and plan of treatment:	Cont with your meds as prescribed. Your A1c was 7.7.  Cont healthy lifestyle and weight loss.   F/u with your PMD for BS check and A1c  Secondary Diagnosis:	Essential hypertension  Assessment and plan of treatment:	Cont with your blood pressure meds as prescribed and follow up for BP check at office Principal Discharge DX:	Edema, unspecified type  Goal:	improved symptoms.  Assessment and plan of treatment:	You had extensive cardiac work up including echo and stress test .  You did not have any evidence of congestive heart failure on echocardiogram and stress test was neg.   Please continue all your meds as prescribed and follow up with your PMD/Card as outpt in 1-2 weeks.   If any worsening symptoms of SOB or swelling notify your doctor or come back to hospital.   Monitor your weight daily and if sudden changes notify your doctors.  Secondary Diagnosis:	Type 2 diabetes mellitus without complication, without long-term current use of insulin  Assessment and plan of treatment:	Cont with your meds as prescribed. Your A1c was 7.7.  Cont healthy lifestyle and weight loss.   F/u with your PMD for BS check and A1c  Secondary Diagnosis:	Essential hypertension  Assessment and plan of treatment:	Cont with your blood pressure meds as prescribed and follow up for BP check at office

## 2018-04-24 LAB
ANION GAP SERPL CALC-SCNC: 11 MMOL/L — SIGNIFICANT CHANGE UP (ref 5–17)
BUN SERPL-MCNC: 22 MG/DL — SIGNIFICANT CHANGE UP (ref 7–23)
CALCIUM SERPL-MCNC: 9.1 MG/DL — SIGNIFICANT CHANGE UP (ref 8.4–10.5)
CHLORIDE SERPL-SCNC: 92 MMOL/L — LOW (ref 96–108)
CO2 SERPL-SCNC: 33 MMOL/L — HIGH (ref 22–31)
CREAT SERPL-MCNC: 1.02 MG/DL — SIGNIFICANT CHANGE UP (ref 0.5–1.3)
GLUCOSE BLDC GLUCOMTR-MCNC: 138 MG/DL — HIGH (ref 70–99)
GLUCOSE BLDC GLUCOMTR-MCNC: 178 MG/DL — HIGH (ref 70–99)
GLUCOSE BLDC GLUCOMTR-MCNC: 211 MG/DL — HIGH (ref 70–99)
GLUCOSE BLDC GLUCOMTR-MCNC: 255 MG/DL — HIGH (ref 70–99)
GLUCOSE SERPL-MCNC: 161 MG/DL — HIGH (ref 70–99)
HBA1C BLD-MCNC: 7.7 % — HIGH (ref 4–5.6)
HCT VFR BLD CALC: 41.9 % — SIGNIFICANT CHANGE UP (ref 39–50)
HGB BLD-MCNC: 13.2 G/DL — SIGNIFICANT CHANGE UP (ref 13–17)
MCHC RBC-ENTMCNC: 25.9 PG — LOW (ref 27–34)
MCHC RBC-ENTMCNC: 31.5 GM/DL — LOW (ref 32–36)
MCV RBC AUTO: 82.2 FL — SIGNIFICANT CHANGE UP (ref 80–100)
PLATELET # BLD AUTO: 143 K/UL — LOW (ref 150–400)
POTASSIUM SERPL-MCNC: 4 MMOL/L — SIGNIFICANT CHANGE UP (ref 3.5–5.3)
POTASSIUM SERPL-SCNC: 4 MMOL/L — SIGNIFICANT CHANGE UP (ref 3.5–5.3)
RBC # BLD: 5.1 M/UL — SIGNIFICANT CHANGE UP (ref 4.2–5.8)
RBC # FLD: 14.3 % — SIGNIFICANT CHANGE UP (ref 10.3–14.5)
SODIUM SERPL-SCNC: 136 MMOL/L — SIGNIFICANT CHANGE UP (ref 135–145)
WBC # BLD: 4.35 K/UL — SIGNIFICANT CHANGE UP (ref 3.8–10.5)
WBC # FLD AUTO: 4.35 K/UL — SIGNIFICANT CHANGE UP (ref 3.8–10.5)

## 2018-04-24 PROCEDURE — 99233 SBSQ HOSP IP/OBS HIGH 50: CPT

## 2018-04-24 RX ADMIN — INSULIN GLARGINE 5 UNIT(S): 100 INJECTION, SOLUTION SUBCUTANEOUS at 22:12

## 2018-04-24 RX ADMIN — Medication 81 MILLIGRAM(S): at 12:04

## 2018-04-24 RX ADMIN — CARVEDILOL PHOSPHATE 6.25 MILLIGRAM(S): 80 CAPSULE, EXTENDED RELEASE ORAL at 12:04

## 2018-04-24 RX ADMIN — CARVEDILOL PHOSPHATE 6.25 MILLIGRAM(S): 80 CAPSULE, EXTENDED RELEASE ORAL at 22:12

## 2018-04-24 RX ADMIN — ENOXAPARIN SODIUM 40 MILLIGRAM(S): 100 INJECTION SUBCUTANEOUS at 05:17

## 2018-04-24 RX ADMIN — SPIRONOLACTONE 25 MILLIGRAM(S): 25 TABLET, FILM COATED ORAL at 05:13

## 2018-04-24 RX ADMIN — Medication 40 MILLIGRAM(S): at 17:04

## 2018-04-24 RX ADMIN — LOSARTAN POTASSIUM 100 MILLIGRAM(S): 100 TABLET, FILM COATED ORAL at 05:12

## 2018-04-24 RX ADMIN — Medication 3: at 12:03

## 2018-04-24 RX ADMIN — PANTOPRAZOLE SODIUM 40 MILLIGRAM(S): 20 TABLET, DELAYED RELEASE ORAL at 06:54

## 2018-04-24 RX ADMIN — Medication 1: at 16:49

## 2018-04-24 RX ADMIN — Medication 40 MILLIGRAM(S): at 05:12

## 2018-04-24 NOTE — PROGRESS NOTE ADULT - SUBJECTIVE AND OBJECTIVE BOX
Patient is a 63y old  Male who presents with a chief complaint of shortness of breath and leg swelling (23 Apr 2018 12:18)        SUBJECTIVE / OVERNIGHT EVENTS:  No acute issues overnight. c/o bilateral foot pain improved.   afebrile. wants to go home. some SOB with exertion. no CP    MEDICATIONS  (STANDING):  aspirin enteric coated 81 milliGRAM(s) Oral daily  carvedilol 6.25 milliGRAM(s) Oral every 12 hours  dextrose 50% Injectable 25 Gram(s) IV Push once  dextrose 50% Injectable 25 Gram(s) IV Push once  enoxaparin Injectable 40 milliGRAM(s) SubCutaneous every 24 hours  furosemide   Injectable 40 milliGRAM(s) IV Push two times a day  insulin glargine Injectable (LANTUS) 5 Unit(s) SubCutaneous at bedtime  insulin lispro (HumaLOG) corrective regimen sliding scale   SubCutaneous three times a day before meals  insulin lispro (HumaLOG) corrective regimen sliding scale   SubCutaneous at bedtime  losartan 100 milliGRAM(s) Oral daily  pantoprazole    Tablet 40 milliGRAM(s) Oral before breakfast  spironolactone 25 milliGRAM(s) Oral daily    MEDICATIONS  (PRN):  acetaminophen   Tablet. 650 milliGRAM(s) Oral every 6 hours PRN Moderate Pain (4 - 6)  dextrose Gel 1 Dose(s) Oral once PRN Blood Glucose LESS THAN 70 milliGRAM(s)/deciliter  glucagon  Injectable 1 milliGRAM(s) IntraMuscular once PRN Glucose LESS THAN 70 milligrams/deciliter      Vital Signs Last 24 Hrs  T(C): 36.5 (24 Apr 2018 11:41), Max: 37.2 (23 Apr 2018 20:32)  T(F): 97.7 (24 Apr 2018 11:41), Max: 99 (23 Apr 2018 20:32)  HR: 57 (24 Apr 2018 11:41) (57 - 62)  BP: 148/79 (24 Apr 2018 11:41) (118/68 - 166/88)  BP(mean): --  RR: 18 (24 Apr 2018 11:41) (18 - 20)  SpO2: 95% (24 Apr 2018 11:41) (94% - 98%)  CAPILLARY BLOOD GLUCOSE      POCT Blood Glucose.: 255 mg/dL (24 Apr 2018 11:40)  POCT Blood Glucose.: 138 mg/dL (24 Apr 2018 08:07)  POCT Blood Glucose.: 154 mg/dL (23 Apr 2018 20:55)  POCT Blood Glucose.: 156 mg/dL (23 Apr 2018 16:46)    I&O's Summary    23 Apr 2018 07:01  -  24 Apr 2018 07:00  --------------------------------------------------------  IN: 840 mL / OUT: 0 mL / NET: 840 mL          PHYSICAL EXAM  GENERAL: NAD, well-developed  HEAD:  Atraumatic, Normocephalic  EYES: EOMI, conjunctiva and sclera clear  NECK: Supple, No JVD  CHEST/LUNG: bibasilar crackles. no wheezing  HEART: Regular rate and rhythm; No murmurs, rubs, or gallops  ABDOMEN: Soft, Nontender, Nondistended; Bowel sounds present  EXTREMITIES:  bilateral LE edema, compression stockings.   PSYCH: AAOx3  SKIN: No rashes or lesions    LABS:                        13.2   4.35  )-----------( 143      ( 24 Apr 2018 07:54 )             41.9     04-24    136  |  92<L>  |  22  ----------------------------<  161<H>  4.0   |  33<H>  |  1.02    Ca    9.1      24 Apr 2018 06:59                  RADIOLOGY & ADDITIONAL TESTS:    Imaging Personally Reviewed:  Consultant(s) Notes Reviewed:    Care Discussed with Consultants/Other Providers:

## 2018-04-24 NOTE — PROGRESS NOTE ADULT - SUBJECTIVE AND OBJECTIVE BOX
Patient denies CP, SOB Review of systems otherwise (-)    acetaminophen   Tablet. 650 milliGRAM(s) Oral every 6 hours PRN  aspirin enteric coated 81 milliGRAM(s) Oral daily  carvedilol 6.25 milliGRAM(s) Oral every 12 hours  dextrose 50% Injectable 25 Gram(s) IV Push once  dextrose 50% Injectable 25 Gram(s) IV Push once  dextrose Gel 1 Dose(s) Oral once PRN  enoxaparin Injectable 40 milliGRAM(s) SubCutaneous every 24 hours  furosemide   Injectable 40 milliGRAM(s) IV Push two times a day  glucagon  Injectable 1 milliGRAM(s) IntraMuscular once PRN  insulin glargine Injectable (LANTUS) 5 Unit(s) SubCutaneous at bedtime  insulin lispro (HumaLOG) corrective regimen sliding scale   SubCutaneous three times a day before meals  insulin lispro (HumaLOG) corrective regimen sliding scale   SubCutaneous at bedtime  losartan 100 milliGRAM(s) Oral daily  pantoprazole    Tablet 40 milliGRAM(s) Oral before breakfast  spironolactone 25 milliGRAM(s) Oral daily                            13.2   4.35  )-----------( 143      ( 24 Apr 2018 07:54 )             41.9       Hemoglobin: 13.2 g/dL (04-24 @ 07:54)  Hemoglobin: 12.4 g/dL (04-23 @ 09:32)  Hemoglobin: 13.1 g/dL (04-22 @ 08:27)  Hemoglobin: 11.6 g/dL (04-20 @ 07:57)      04-24    136  |  92<L>  |  22  ----------------------------<  161<H>  4.0   |  33<H>  |  1.02    Ca    9.1      24 Apr 2018 06:59      Creatinine Trend: 1.02<--, 0.97<--, 1.02<--, 1.10<--, 1.01<--, 0.89<--    COAGS:           T(C): 36.5 (04-24-18 @ 11:41), Max: 37.2 (04-23-18 @ 20:32)  HR: 57 (04-24-18 @ 11:41) (57 - 62)  BP: 148/79 (04-24-18 @ 11:41) (118/68 - 166/88)  RR: 18 (04-24-18 @ 11:41) (18 - 20)  SpO2: 95% (04-24-18 @ 11:41) (94% - 98%)  Wt(kg): --    I&O's Summary    23 Apr 2018 07:01  -  24 Apr 2018 07:00  --------------------------------------------------------  IN: 840 mL / OUT: 0 mL / NET: 840 mL    24 Apr 2018 07:01  -  24 Apr 2018 16:23  --------------------------------------------------------  IN: 480 mL / OUT: 0 mL / NET: 480 mL          HEENT: Normal Oral mucosa, PERRL, EOMI  Lymphatic: No obvious lymphadenopathy, +BL LE edema  Cardiovascular: Normal S1S2, No JVD, 1/6 AYAKA, Peripheral pulses palpable 2+ B/L  Respiratory: Lungs clear to auscultation, normal effort  Gastrointestinal: Abdomen soft, ND, NT, +BS  Skin: Warm, dry, intact. No cyanosis, No rash.  Musculoskeletal: Normal ROM, normal strength  Psychiatric: Appropriate Mood and Affect      DIAGNOSTIC DATA  TELEMETRY: NSR 50-70s     RADIOLOGY:   < from: Xray Chest 2 Views PA/Lat (04.18.18 @ 20:14) >  FINDINGS:  The lungs are clear.  Trace bilateral pleural effusions.  No pneumothorax.  The cardiomediastinal silhouette is within normal limits.  Age indeterminant right posterior fourth and fifth rib fracture.        TTE : 2016    Conclusions:   1. Normal left ventricular size with normal systolic function.   2. Mild diastolic dysfunction.   3. Normal right ventricular size and function.   4. Mild left atrial enlargement.           ASSESSMENT AND PLAN:  This is a 63 year old male known to our office with HTN DM with historically normal LV function on TTE 2016 with no prior ischemic evaluation on file admitted with progressively worsening SOB and BL LE edema:     -- preserved LV function on echo  - awaiting stress    Josh Jacobson MD, FACC

## 2018-04-25 VITALS
HEART RATE: 67 BPM | DIASTOLIC BLOOD PRESSURE: 75 MMHG | RESPIRATION RATE: 18 BRPM | OXYGEN SATURATION: 98 % | TEMPERATURE: 97 F | SYSTOLIC BLOOD PRESSURE: 130 MMHG

## 2018-04-25 LAB
ANION GAP SERPL CALC-SCNC: 9 MMOL/L — SIGNIFICANT CHANGE UP (ref 5–17)
BUN SERPL-MCNC: 22 MG/DL — SIGNIFICANT CHANGE UP (ref 7–23)
CALCIUM SERPL-MCNC: 9 MG/DL — SIGNIFICANT CHANGE UP (ref 8.4–10.5)
CHLORIDE SERPL-SCNC: 96 MMOL/L — SIGNIFICANT CHANGE UP (ref 96–108)
CO2 SERPL-SCNC: 33 MMOL/L — HIGH (ref 22–31)
CREAT SERPL-MCNC: 1.03 MG/DL — SIGNIFICANT CHANGE UP (ref 0.5–1.3)
GLUCOSE BLDC GLUCOMTR-MCNC: 134 MG/DL — HIGH (ref 70–99)
GLUCOSE BLDC GLUCOMTR-MCNC: 252 MG/DL — HIGH (ref 70–99)
GLUCOSE BLDC GLUCOMTR-MCNC: 264 MG/DL — HIGH (ref 70–99)
GLUCOSE SERPL-MCNC: 133 MG/DL — HIGH (ref 70–99)
HCT VFR BLD CALC: 43.4 % — SIGNIFICANT CHANGE UP (ref 39–50)
HGB BLD-MCNC: 13.5 G/DL — SIGNIFICANT CHANGE UP (ref 13–17)
MCHC RBC-ENTMCNC: 25.8 PG — LOW (ref 27–34)
MCHC RBC-ENTMCNC: 31.1 GM/DL — LOW (ref 32–36)
MCV RBC AUTO: 83 FL — SIGNIFICANT CHANGE UP (ref 80–100)
PLATELET # BLD AUTO: 150 K/UL — SIGNIFICANT CHANGE UP (ref 150–400)
POTASSIUM SERPL-MCNC: 4.3 MMOL/L — SIGNIFICANT CHANGE UP (ref 3.5–5.3)
POTASSIUM SERPL-SCNC: 4.3 MMOL/L — SIGNIFICANT CHANGE UP (ref 3.5–5.3)
RBC # BLD: 5.23 M/UL — SIGNIFICANT CHANGE UP (ref 4.2–5.8)
RBC # FLD: 14.6 % — HIGH (ref 10.3–14.5)
SODIUM SERPL-SCNC: 138 MMOL/L — SIGNIFICANT CHANGE UP (ref 135–145)
WBC # BLD: 4.74 K/UL — SIGNIFICANT CHANGE UP (ref 3.8–10.5)
WBC # FLD AUTO: 4.74 K/UL — SIGNIFICANT CHANGE UP (ref 3.8–10.5)

## 2018-04-25 PROCEDURE — 93016 CV STRESS TEST SUPVJ ONLY: CPT

## 2018-04-25 PROCEDURE — 93018 CV STRESS TEST I&R ONLY: CPT

## 2018-04-25 PROCEDURE — 78452 HT MUSCLE IMAGE SPECT MULT: CPT | Mod: 26

## 2018-04-25 PROCEDURE — 99239 HOSP IP/OBS DSCHRG MGMT >30: CPT

## 2018-04-25 RX ORDER — ASPIRIN/CALCIUM CARB/MAGNESIUM 324 MG
1 TABLET ORAL
Qty: 30 | Refills: 0 | OUTPATIENT
Start: 2018-04-25 | End: 2018-05-24

## 2018-04-25 RX ORDER — PANTOPRAZOLE SODIUM 20 MG/1
1 TABLET, DELAYED RELEASE ORAL
Qty: 30 | Refills: 0 | OUTPATIENT
Start: 2018-04-25 | End: 2018-05-24

## 2018-04-25 RX ORDER — FUROSEMIDE 40 MG
40 TABLET ORAL
Qty: 0 | Refills: 0 | Status: DISCONTINUED | OUTPATIENT
Start: 2018-04-25 | End: 2018-04-25

## 2018-04-25 RX ORDER — SPIRONOLACTONE 25 MG/1
1 TABLET, FILM COATED ORAL
Qty: 30 | Refills: 0 | OUTPATIENT
Start: 2018-04-25 | End: 2018-05-24

## 2018-04-25 RX ORDER — CARVEDILOL PHOSPHATE 80 MG/1
1 CAPSULE, EXTENDED RELEASE ORAL
Qty: 60 | Refills: 0 | OUTPATIENT
Start: 2018-04-25 | End: 2018-05-24

## 2018-04-25 RX ORDER — FUROSEMIDE 40 MG
1 TABLET ORAL
Qty: 60 | Refills: 0 | OUTPATIENT
Start: 2018-04-25 | End: 2018-05-24

## 2018-04-25 RX ORDER — CARVEDILOL PHOSPHATE 80 MG/1
1 CAPSULE, EXTENDED RELEASE ORAL
Qty: 0 | Refills: 0 | COMMUNITY

## 2018-04-25 RX ADMIN — Medication 40 MILLIGRAM(S): at 05:27

## 2018-04-25 RX ADMIN — Medication 3: at 12:51

## 2018-04-25 RX ADMIN — CARVEDILOL PHOSPHATE 6.25 MILLIGRAM(S): 80 CAPSULE, EXTENDED RELEASE ORAL at 05:27

## 2018-04-25 RX ADMIN — Medication 3: at 17:23

## 2018-04-25 RX ADMIN — ENOXAPARIN SODIUM 40 MILLIGRAM(S): 100 INJECTION SUBCUTANEOUS at 06:23

## 2018-04-25 RX ADMIN — CARVEDILOL PHOSPHATE 6.25 MILLIGRAM(S): 80 CAPSULE, EXTENDED RELEASE ORAL at 17:23

## 2018-04-25 RX ADMIN — LOSARTAN POTASSIUM 100 MILLIGRAM(S): 100 TABLET, FILM COATED ORAL at 05:27

## 2018-04-25 RX ADMIN — Medication 81 MILLIGRAM(S): at 12:50

## 2018-04-25 RX ADMIN — PANTOPRAZOLE SODIUM 40 MILLIGRAM(S): 20 TABLET, DELAYED RELEASE ORAL at 06:23

## 2018-04-25 RX ADMIN — SPIRONOLACTONE 25 MILLIGRAM(S): 25 TABLET, FILM COATED ORAL at 06:23

## 2018-04-25 RX ADMIN — Medication 40 MILLIGRAM(S): at 17:23

## 2018-04-25 NOTE — PROGRESS NOTE ADULT - SUBJECTIVE AND OBJECTIVE BOX
Patient is a 63y old  Male who presents with a chief complaint of shortness of breath and leg swelling (23 Apr 2018 12:18)        SUBJECTIVE / OVERNIGHT EVENTS:  no acute issues overnight. afebrile.   Foot pain. no CP.     MEDICATIONS  (STANDING):  aspirin enteric coated 81 milliGRAM(s) Oral daily  carvedilol 6.25 milliGRAM(s) Oral every 12 hours  dextrose 50% Injectable 25 Gram(s) IV Push once  dextrose 50% Injectable 25 Gram(s) IV Push once  enoxaparin Injectable 40 milliGRAM(s) SubCutaneous every 24 hours  furosemide   Injectable 40 milliGRAM(s) IV Push two times a day  insulin glargine Injectable (LANTUS) 5 Unit(s) SubCutaneous at bedtime  insulin lispro (HumaLOG) corrective regimen sliding scale   SubCutaneous three times a day before meals  insulin lispro (HumaLOG) corrective regimen sliding scale   SubCutaneous at bedtime  losartan 100 milliGRAM(s) Oral daily  pantoprazole    Tablet 40 milliGRAM(s) Oral before breakfast  spironolactone 25 milliGRAM(s) Oral daily    MEDICATIONS  (PRN):  acetaminophen   Tablet. 650 milliGRAM(s) Oral every 6 hours PRN Moderate Pain (4 - 6)  dextrose Gel 1 Dose(s) Oral once PRN Blood Glucose LESS THAN 70 milliGRAM(s)/deciliter  glucagon  Injectable 1 milliGRAM(s) IntraMuscular once PRN Glucose LESS THAN 70 milligrams/deciliter      Vital Signs Last 24 Hrs  T(C): 36.6 (25 Apr 2018 08:57), Max: 36.8 (25 Apr 2018 04:03)  T(F): 97.9 (25 Apr 2018 08:57), Max: 98.2 (25 Apr 2018 04:03)  HR: 57 (25 Apr 2018 08:57) (57 - 69)  BP: 111/70 (25 Apr 2018 08:57) (111/70 - 153/83)  BP(mean): --  RR: 18 (25 Apr 2018 08:57) (18 - 18)  SpO2: 94% (25 Apr 2018 08:57) (92% - 96%)  CAPILLARY BLOOD GLUCOSE      POCT Blood Glucose.: 134 mg/dL (25 Apr 2018 07:51)  POCT Blood Glucose.: 211 mg/dL (24 Apr 2018 21:52)  POCT Blood Glucose.: 178 mg/dL (24 Apr 2018 16:31)    I&O's Summary    24 Apr 2018 07:01  -  25 Apr 2018 07:00  --------------------------------------------------------  IN: 960 mL / OUT: 0 mL / NET: 960 mL          PHYSICAL EXAM  GENERAL: NAD, well-developed  HEAD:  Atraumatic, Normocephalic  EYES: EOMI, conjunctiva and sclera clear  NECK: Supple, No JVD  CHEST/LUNG: Clear to auscultation bilaterally; No wheeze  HEART: Regular rate and rhythm; No murmurs, rubs, or gallops  ABDOMEN: Soft, Nontender, Nondistended; Bowel sounds present  EXTREMITIES:  bilateral LE edema. +pitting/   PSYCH: AAOx3  SKIN: No rashes or lesions    LABS:                        13.5   4.74  )-----------( 150      ( 25 Apr 2018 07:57 )             43.4     04-25    138  |  96  |  22  ----------------------------<  133<H>  4.3   |  33<H>  |  1.03    Ca    9.0      25 Apr 2018 06:29                  RADIOLOGY & ADDITIONAL TESTS:    Imaging Personally Reviewed:  Consultant(s) Notes Reviewed:    Care Discussed with Consultants/Other Providers: Patient is a 63y old  Male who presents with a chief complaint of shortness of breath and leg swelling (23 Apr 2018 12:18)        SUBJECTIVE / OVERNIGHT EVENTS:  no acute issues overnight. afebrile.   Foot pain. no CP.     MEDICATIONS  (STANDING):  aspirin enteric coated 81 milliGRAM(s) Oral daily  carvedilol 6.25 milliGRAM(s) Oral every 12 hours  dextrose 50% Injectable 25 Gram(s) IV Push once  dextrose 50% Injectable 25 Gram(s) IV Push once  enoxaparin Injectable 40 milliGRAM(s) SubCutaneous every 24 hours  furosemide   Injectable 40 milliGRAM(s) IV Push two times a day  insulin glargine Injectable (LANTUS) 5 Unit(s) SubCutaneous at bedtime  insulin lispro (HumaLOG) corrective regimen sliding scale   SubCutaneous three times a day before meals  insulin lispro (HumaLOG) corrective regimen sliding scale   SubCutaneous at bedtime  losartan 100 milliGRAM(s) Oral daily  pantoprazole    Tablet 40 milliGRAM(s) Oral before breakfast  spironolactone 25 milliGRAM(s) Oral daily    MEDICATIONS  (PRN):  acetaminophen   Tablet. 650 milliGRAM(s) Oral every 6 hours PRN Moderate Pain (4 - 6)  dextrose Gel 1 Dose(s) Oral once PRN Blood Glucose LESS THAN 70 milliGRAM(s)/deciliter  glucagon  Injectable 1 milliGRAM(s) IntraMuscular once PRN Glucose LESS THAN 70 milligrams/deciliter      Vital Signs Last 24 Hrs  T(C): 36.6 (25 Apr 2018 08:57), Max: 36.8 (25 Apr 2018 04:03)  T(F): 97.9 (25 Apr 2018 08:57), Max: 98.2 (25 Apr 2018 04:03)  HR: 57 (25 Apr 2018 08:57) (57 - 69)  BP: 111/70 (25 Apr 2018 08:57) (111/70 - 153/83)  BP(mean): --  RR: 18 (25 Apr 2018 08:57) (18 - 18)  SpO2: 94% (25 Apr 2018 08:57) (92% - 96%)  CAPILLARY BLOOD GLUCOSE      POCT Blood Glucose.: 134 mg/dL (25 Apr 2018 07:51)  POCT Blood Glucose.: 211 mg/dL (24 Apr 2018 21:52)  POCT Blood Glucose.: 178 mg/dL (24 Apr 2018 16:31)    I&O's Summary    24 Apr 2018 07:01  -  25 Apr 2018 07:00  --------------------------------------------------------  IN: 960 mL / OUT: 0 mL / NET: 960 mL          PHYSICAL EXAM  GENERAL: NAD, well-developed  HEAD:  Atraumatic, Normocephalic  EYES: EOMI, conjunctiva and sclera clear  NECK: Supple, No JVD  CHEST/LUNG: Clear to auscultation bilaterally; No wheeze  HEART: Regular rate and rhythm; No murmurs, rubs, or gallops  ABDOMEN: Soft, Nontender, Nondistended; Bowel sounds present  EXTREMITIES:  bilateral LE edema. +pitting/  PSYCH: AAOx3  SKIN: No rashes or lesions    LABS:                        13.5   4.74  )-----------( 150      ( 25 Apr 2018 07:57 )             43.4     04-25    138  |  96  |  22  ----------------------------<  133<H>  4.3   |  33<H>  |  1.03    Ca    9.0      25 Apr 2018 06:29                  RADIOLOGY & ADDITIONAL TESTS:    Imaging Personally Reviewed:  Consultant(s) Notes Reviewed:    Care Discussed with Consultants/Other Providers:

## 2018-04-25 NOTE — PROGRESS NOTE ADULT - PROBLEM SELECTOR PLAN 2
-Holding home oral DM meds while inpatient.   -C/w lantus 5 units at bedtime and CAROL QAC/HS, adjust insulin dosing as needed.  -C/w diabetic DASH diet. check A1c
-Holding home oral DM meds while inpatient.   -C/w lantus 5 units at bedtime and CAROL QAC/HS, adjust insulin dosing as needed.  -C/w diabetic DASH diet. check A1c
-Likely chronic given no distress on exam. Resolved. Likely 2/2 acute CHF exacerbation
-Likely chronic given no distress on exam. Resolved. Likely 2/2 acute CHF exacerbation
-Holding home oral DM meds while inpatient.   -C/w lantus 5 units at bedtime and CAROL QAC/HS, adjust insulin dosing as needed.  -C/w diabetic DASH diet.
-Holding home oral DM meds while inpatient.   -C/w lantus 5 units at bedtime and CAROL QAC/HS, adjust insulin dosing as needed.  -C/w diabetic DASH diet.
-Holding home oral DM meds while inpatient.   -C/w lantus 5 units at bedtime and CAROL QAC/HS, adjust insulin dosing as needed.  -C/w diabetic DASH diet. check A1c

## 2018-04-25 NOTE — PROGRESS NOTE ADULT - PROBLEM SELECTOR PROBLEM 3
Abdominal discomfort
Abdominal discomfort
Type 2 diabetes mellitus without complication, without long-term current use of insulin
Type 2 diabetes mellitus without complication, without long-term current use of insulin
Abdominal discomfort

## 2018-04-25 NOTE — CHART NOTE - NSCHARTNOTEFT_GEN_A_CORE
Discussed stress test result with Dr. Mathur and cleared patient for discharge. Request from Dr. Mathur to facilitate patient discharge. Medication reconciliation reviewed, revised, and resolved by Dr. Mathur who had medically cleared patient for discharge with follow-up as advised. Please refer to discharge note for detailed hospital course. Patient is currently stable for discharge to home at this time.    Contact # [phone]

## 2018-04-25 NOTE — PROGRESS NOTE ADULT - ATTENDING COMMENTS
Jose Gipson MD  Division of [facility]  Cell: [phone]  Pager: [phone]  Office: [phone]
Jose Gipson MD  Division of [facility]  Cell: [phone]  Pager: [phone]  Office: [phone]
Agree with above.   -NST normal  -no further cardiac workup needed at this time    Sergio Ryan MD
CARDIOLOGY ATTENDING    Patient seen and examined. Agree with above. Admitted with dyspnea on exertion. Echo unremarkable. Therefore recommend exercise-nuclear stress test r/o ischemia.
Patient seen and examined.  Agree with above.   -check TTE to assess LV function    Sergio Ryan MD  [facility]  [address], [address]  [place] [address]  office: [phone]  pager: [phone]
Agree with above.   -check tte      Sergio Ryan MD  [facility]  [address], [address]  [place] [address]  office: [phone]  pager: [phone]
D/c planning pending result of stress,  Will discuss with cardiology re: diuretics.   D/c time 40 mins

## 2018-04-25 NOTE — PROGRESS NOTE ADULT - ASSESSMENT
63 year old male with PMH of DM-2 and HTN; presented with shortness of breath and leg swelling for ~2-years, likely acute on chronic heart failure.
62 yo M w/ DM2 & HTN presents with shortness of breath and leg swelling for 2-years likely acute on chronic heart failure.
63 year old male with PMH of DM-2 and HTN; presented with shortness of breath and leg swelling for ~2-years, likely acute on chronic heart failure.
64 yo M w/ DM2 & HTN presents with shortness of breath and leg swelling for 2-years likely acute on chronic heart failure.
63 year old male with PMH of DM-2 and HTN; presented with shortness of breath and leg swelling for ~2-years, likely acute on chronic heart failure.

## 2018-04-25 NOTE — PROGRESS NOTE ADULT - PROBLEM SELECTOR PLAN 1
- s/p echo without significant abnormality to explain presenting symptoms.   - Card follow up noted. will order exercise nuc stress test.   - BNP 2150 on admission, CXR with pleural effusions.  - C/w Lasix 40mg IV BID and spironolactone for goal of net negative output.   - C/w daily weights and strict I/O monitoring.  - Monitor electrolytes on IV diuretics and replete as needed.   -C/w ASA and coreg and losartan.   -Compression stockings for LE edema;
- Awaiting result of echo today  - BNP 2150 on admission, CXR with pleural effusions.  - C/w Lasix 40mg IV BID and spironolactone for goal of net negative output.   - C/w daily weights and strict I/O monitoring.  - Monitor electrolytes on IV diuretics and replete as needed.   -C/w ASA and coreg and losartan.   -Compression stockings for LE edema;
-Suspect SOB likely 2/2 heart failure. Mildly fluid overloaded on examination, BNP 2150, CXR with pleural effusion  - c/w furosemide 20mg IV BID.  -check daily weights  -Monitor electrolytes on IV diuretics  -Monitor on telemetry  -C/w ASA and coreg  -CE negative x2  - TTE pending
Suspect SOB likely 2/2 heart failure. Mildly fluid overloaded on examination, BNP 2150, CXR with pleural effusion  - Net neg 280cc over last 24 hours on Lasix 20mg IV BID. Will increase to 40mg BID  -check daily weights, strict I/O monitoring  -Monitor electrolytes on IV diuretics  -Monitor on telemetry  -C/w ASA and coreg  -CE negative x2  - TTE pending  - Cardiology following
- s/p echo without significant abnormality to explain presenting symptoms.   - Awaiting nuc stress test today.   - BNP 2150 on admission, CXR with pleural effusions.  - C/w Lasix 40mg IV BID and spironolactone for goal of net negative output.   - C/w daily weights and strict I/O monitoring.  - Monitor electrolytes on IV diuretics and replete as needed.   -C/w ASA and coreg and losartan.   -Compression stockings for LE edema;
-Suspect SOB was likely secondary to heart failure. Fluid overloaded on examination, BNP 2150 on admission, CXR with pleural effusions.  -C/w Lasix 40mg IV BID for goal of net negative output.   -C/w daily weights and strict I/O monitoring.  -Monitor electrolytes on IV diuretics and replete as needed.   -Monitor on telemetry  -C/w ASA and coreg and losartan.   -CE negative x2  -TTE pending.  -Cardiology recs appreciated.   -Repeat BNP now 1906, showing some response to diuresis.   -Compression stockings for LE edema; have helped.  -Started spironolactone 25mg daily in view of CHF and HTN not yet well controlled.
-Suspect SOB was likely secondary to heart failure. Fluid overloaded on examination, BNP 2150 on admission, CXR with pleural effusions.  -C/w Lasix 40mg IV BID for goal of net negative output.   -C/w daily weights and strict I/O monitoring.  -Monitor electrolytes on IV diuretics and replete as needed.   -Monitor on telemetry  -C/w ASA and coreg and losartan.   -CE negative x2  -TTE pending.  -Cardiology recs appreciated.   -Will repeat BNP in am.   -Compression stockings for LE edema.

## 2018-04-25 NOTE — PROGRESS NOTE ADULT - SUBJECTIVE AND OBJECTIVE BOX
MEDICATIONS  (STANDING):  aspirin enteric coated 81 milliGRAM(s) Oral daily  carvedilol 6.25 milliGRAM(s) Oral every 12 hours  enoxaparin Injectable 40 milliGRAM(s) SubCutaneous every 24 hours  furosemide   Injectable 40 milliGRAM(s) IV Push two times a day  insulin glargine Injectable (LANTUS) 5 Unit(s) SubCutaneous at bedtime  insulin lispro (HumaLOG) corrective regimen sliding scale   SubCutaneous three times a day before meals  insulin lispro (HumaLOG) corrective regimen sliding scale   SubCutaneous at bedtime  losartan 100 milliGRAM(s) Oral daily  pantoprazole    Tablet 40 milliGRAM(s) Oral before breakfast  spironolactone 25 milliGRAM(s) Oral daily    MEDICATIONS  (PRN):  acetaminophen   Tablet. 650 milliGRAM(s) Oral every 6 hours PRN Moderate Pain (4 - 6)  dextrose Gel 1 Dose(s) Oral once PRN Blood Glucose LESS THAN 70 milliGRAM(s)/deciliter  glucagon  Injectable 1 milliGRAM(s) IntraMuscular once PRN Glucose LESS THAN 70 milligrams/deciliter      LABS:                        13.5   4.74  )-----------( 150      ( 25 Apr 2018 07:57 )             43.4     Hemoglobin: 13.5 g/dL (04-25 @ 07:57)  Hemoglobin: 13.2 g/dL (04-24 @ 07:54)  Hemoglobin: 12.4 g/dL (04-23 @ 09:32)  Hemoglobin: 13.1 g/dL (04-22 @ 08:27)    04-25    138  |  96  |  22  ----------------------------<  133<H>  4.3   |  33<H>  |  1.03    Ca    9.0      25 Apr 2018 06:29      Creatinine Trend: 1.03<--, 1.02<--, 0.97<--, 1.02<--, 1.10<--, 1.01<--           PHYSICAL EXAM  Vital Signs Last 24 Hrs  T(C): 36.6 (25 Apr 2018 14:39), Max: 36.8 (25 Apr 2018 04:03)  T(F): 97.8 (25 Apr 2018 14:39), Max: 98.2 (25 Apr 2018 04:03)  HR: 66 (25 Apr 2018 14:39) (57 - 69)  BP: 119/73 (25 Apr 2018 14:39) (111/70 - 153/83)  BP(mean): --  RR: 18 (25 Apr 2018 14:39) (18 - 18)  SpO2: 96% (25 Apr 2018 14:39) (92% - 96%)  Patient denies CP, SOB Review of systems otherwise (-)      HEENT: Normal Oral mucosa, PERRL, EOMI  Lymphatic: No obvious lymphadenopathy, +BL LE edema  Cardiovascular: Normal S1S2, No JVD, 1/6 AYAKA, Peripheral pulses palpable 2+ B/L  Respiratory: Lungs clear to auscultation, normal effort  Gastrointestinal: Abdomen soft, ND, NT, +BS  Skin: Warm, dry, intact. No cyanosis, No rash.  Musculoskeletal: Normal ROM, normal strength  Psychiatric: Appropriate Mood and Affect      DIAGNOSTIC DATA  TELEMETRY: NSR 50-70s     RADIOLOGY:   < from: Xray Chest 2 Views PA/Lat (04.18.18 @ 20:14) >  FINDINGS:  The lungs are clear.  Trace bilateral pleural effusions.  No pneumothorax.  The cardiomediastinal silhouette is within normal limits.  Age indeterminant right posterior fourth and fifth rib fracture.        TTE : 2016    Conclusions:   1. Normal left ventricular size with normal systolic function.   2. Mild diastolic dysfunction.   3. Normal right ventricular size and function.   4. Mild left atrial enlargement.         < from: Transthoracic Echocardiogram (04.23.18 @ 07:55) >  Conclusions:  1. Mitral annular calcification, otherwise normal mitral  valve. Minimal mitral regurgitation.  2. Thickened aortic valve.  3. Endocardium not well visualized; grossly normal left  ventricular systolic function.  4. Normal right ventricular size and function.  *** No previous Echo exam.    < end of copied text >    ASSESSMENT AND PLAN:  This is a 63 year old male known to our office with HTN DM with historically normal LV function on TTE 2016 with no prior ischemic evaluation on file admitted with progressively worsening SOB and BL LE edema:     -- preserved LV function on echo  -- awaiting stress report   -- would c/w Cozaar 100mg daily   -- patient on Coreg 25mg BID at home however HR trend has been 50-60s so now on Coreg 6.25mg BID  -- would change Lasix to 40mg PO BID and c/w Aldactone 25mg PO daily for BP control/keep net negative   -- HD stable   -- final recs pending above   -- will arrange outpt f/u with Dr Cespedes upon CHAMP Mckeon RPA-C

## 2018-04-25 NOTE — PROGRESS NOTE ADULT - PROBLEM SELECTOR PLAN 6
-Patient reports chronic intermittent right lower back pain.   -C/w tylenol as needed for pain.
-Lovenox for DVT ppx
-Lovenox for DVT ppx
-Patient reports chronic intermittent right lower back pain.   -F/u lumbar spine X-ray.  -C/w tylenol as needed for pain.
-Mild thrombocytopenia noted.   -Stable on CBC from today.
-Mild thrombocytopenia noted.   -Will check CBC again in am.
-Patient reports chronic intermittent right lower back pain.   -C/w tylenol as needed for pain.

## 2018-04-25 NOTE — PROGRESS NOTE ADULT - PROBLEM SELECTOR PLAN 3
-RUQ US showed normal liver and cholelithiasis without cholecystitis.   -Patient reports some improvement with pantoprazole daily.
-Holding home oral DM meds  -Started on basal bolus insulin therapy. Monitor FS, adjust insulin dosing as needed  -check FSG TID and bedtime
-Holding home oral DM meds  -Started on basal bolus insulin therapy. Monitor FS, adjust insulin dosing as needed  -check FSG TID and bedtime
-RUQ US showed normal liver and cholelithiasis without cholecystitis.   -Patient reports some improvement with pantoprazole daily.
-RUQ US showed normal liver and cholelithiasis without cholecystitis.   -Patient reports some improvement with pantoprazole daily.
-RUQ US showed normal liver and cholelithiasis without cholecystitis.   -Patient reports some improvement with pantoprazole daily.  -LFT's were normal. LFT's today still normal.
-RUQ US showed normal liver and cholelithiasis without cholecystitis.   -Patient reports some improvement with pantoprazole daily.  -LFT's were normal. Will check again in am.

## 2018-04-25 NOTE — PROGRESS NOTE ADULT - PROBLEM SELECTOR PROBLEM 2
Type 2 diabetes mellitus without complication, without long-term current use of insulin
Acute respiratory failure with hypercapnia
Acute respiratory failure with hypercapnia
Type 2 diabetes mellitus without complication, without long-term current use of insulin

## 2018-04-25 NOTE — PROGRESS NOTE ADULT - PROBLEM SELECTOR PLAN 5
-Cont reduce dose Coreg 6.25mg twice daily in view of fatigue and persistent sinus bradycardia.   -TSH within normal limits.
-Cont reduce dose Coreg 6.25mg twice daily in view of fatigue and persistent sinus bradycardia.   -TSH within normal limits.
-Likely 2/2 Coreg c/w 12.5mg BID  -TSH WNL
-Likely 2/2 Coreg c/w 12.5mg BID  -TSH WNL
-Cont reduce dose Coreg 6.25mg twice daily in view of fatigue and persistent sinus bradycardia.   -TSH within normal limits.
-Likely secondary to Coreg; c/w 12.5mg BID  -TSH within normal limits.
-Likely secondary to Coreg; will reduce to 6.25mg twice daily in view of fatigue and persistent sinus bradycardia.   -TSH within normal limits.

## 2018-04-25 NOTE — PROGRESS NOTE ADULT - PROBLEM SELECTOR PLAN 7
-C/w Lovenox for DVT PPx.  -OOB to chair and ambulate as tolerated.    10. Dispo: -DC planning soon once echo complete and on stable diuretic regimen and if no further inpatient cardiac workup/management planned. -C/w Lovenox for DVT PPx.  -OOB to chair and ambulate as tolerated.

## 2018-04-25 NOTE — PROGRESS NOTE ADULT - PROBLEM SELECTOR PROBLEM 5
Sinus bradycardia

## 2018-04-25 NOTE — PROGRESS NOTE ADULT - PROBLEM SELECTOR PROBLEM 7
Prophylactic measure
Prophylactic measure
Fatigue, unspecified type
Prophylactic measure
Prophylactic measure

## 2018-04-25 NOTE — PROGRESS NOTE ADULT - PROBLEM SELECTOR PROBLEM 1
Acute on chronic heart failure, unspecified heart failure type

## 2018-04-25 NOTE — PROGRESS NOTE ADULT - PROBLEM SELECTOR PROBLEM 6
Chronic right-sided low back pain without sciatica
Chronic right-sided low back pain without sciatica
Prophylactic measure
Prophylactic measure
Chronic right-sided low back pain without sciatica
Thrombocytopenia
Thrombocytopenia

## 2018-05-23 PROCEDURE — A9500: CPT

## 2018-05-23 PROCEDURE — 84100 ASSAY OF PHOSPHORUS: CPT

## 2018-05-23 PROCEDURE — 82607 VITAMIN B-12: CPT

## 2018-05-23 PROCEDURE — 84480 ASSAY TRIIODOTHYRONINE (T3): CPT

## 2018-05-23 PROCEDURE — 82947 ASSAY GLUCOSE BLOOD QUANT: CPT

## 2018-05-23 PROCEDURE — 84443 ASSAY THYROID STIM HORMONE: CPT

## 2018-05-23 PROCEDURE — 82553 CREATINE MB FRACTION: CPT

## 2018-05-23 PROCEDURE — 85014 HEMATOCRIT: CPT

## 2018-05-23 PROCEDURE — 84484 ASSAY OF TROPONIN QUANT: CPT

## 2018-05-23 PROCEDURE — 80053 COMPREHEN METABOLIC PANEL: CPT

## 2018-05-23 PROCEDURE — 82962 GLUCOSE BLOOD TEST: CPT

## 2018-05-23 PROCEDURE — 93306 TTE W/DOPPLER COMPLETE: CPT

## 2018-05-23 PROCEDURE — 83880 ASSAY OF NATRIURETIC PEPTIDE: CPT

## 2018-05-23 PROCEDURE — 82746 ASSAY OF FOLIC ACID SERUM: CPT

## 2018-05-23 PROCEDURE — 96374 THER/PROPH/DIAG INJ IV PUSH: CPT

## 2018-05-23 PROCEDURE — 83550 IRON BINDING TEST: CPT

## 2018-05-23 PROCEDURE — 82435 ASSAY OF BLOOD CHLORIDE: CPT

## 2018-05-23 PROCEDURE — 83036 HEMOGLOBIN GLYCOSYLATED A1C: CPT

## 2018-05-23 PROCEDURE — 85027 COMPLETE CBC AUTOMATED: CPT

## 2018-05-23 PROCEDURE — 84132 ASSAY OF SERUM POTASSIUM: CPT

## 2018-05-23 PROCEDURE — 82803 BLOOD GASES ANY COMBINATION: CPT

## 2018-05-23 PROCEDURE — 72100 X-RAY EXAM L-S SPINE 2/3 VWS: CPT

## 2018-05-23 PROCEDURE — 82550 ASSAY OF CK (CPK): CPT

## 2018-05-23 PROCEDURE — 76700 US EXAM ABDOM COMPLETE: CPT

## 2018-05-23 PROCEDURE — 84295 ASSAY OF SERUM SODIUM: CPT

## 2018-05-23 PROCEDURE — 82728 ASSAY OF FERRITIN: CPT

## 2018-05-23 PROCEDURE — 82533 TOTAL CORTISOL: CPT

## 2018-05-23 PROCEDURE — 83605 ASSAY OF LACTIC ACID: CPT

## 2018-05-23 PROCEDURE — 93017 CV STRESS TEST TRACING ONLY: CPT

## 2018-05-23 PROCEDURE — 82330 ASSAY OF CALCIUM: CPT

## 2018-05-23 PROCEDURE — 99285 EMERGENCY DEPT VISIT HI MDM: CPT | Mod: 25

## 2018-05-23 PROCEDURE — 83735 ASSAY OF MAGNESIUM: CPT

## 2018-05-23 PROCEDURE — 78452 HT MUSCLE IMAGE SPECT MULT: CPT

## 2018-05-23 PROCEDURE — 80076 HEPATIC FUNCTION PANEL: CPT

## 2018-05-23 PROCEDURE — 80048 BASIC METABOLIC PNL TOTAL CA: CPT

## 2018-05-23 PROCEDURE — 71046 X-RAY EXAM CHEST 2 VIEWS: CPT

## 2018-05-23 PROCEDURE — 84436 ASSAY OF TOTAL THYROXINE: CPT

## 2019-06-26 NOTE — ED ADULT NURSE NOTE - NS ED NOTE ABUSE SUSPICION NEGLECT YN
Pts wife Jody is calling to inform Dr Mak that Delmer came home today from having a new heart valve placed at [facility].  They started him on 2 new medications since this most recent surgery and admission.  She has been put on Plavix 75mg daily and baby asa 81mg ; both are PO daily.  Delmer has a f/u appt with the surgeon on July 8th and has our f/u with Dr Mak on July 10th here at the [facility].  Writer will review with Dr Mak and inform him of the information and will contact Jody back prn.  She will let us know if anything changes prior to the appt with Dr Mak on the 10th.  Meds will be added to his med list here at [place].  Carla Capellan RN, OCN      No

## 2019-11-13 NOTE — PROGRESS NOTE ADULT - PROVIDER SPECIALTY LIST ADULT
Cardiology
Hospitalist
Hospitalist
Internal Medicine
Latha is a 25 year old female here for an obstetrics check.  She is      . Gestational age: 36w1d     She reports fetal movement  She denies contractions  She denies bleeding  She denies headaches  She denies edema  She denies abdominal pain  She denies rupture of membranes  She denies vision changes        See Prenatal Flowsheet for additional comments.  
Pt has no complaints. Denies leaking, bleeding, or uterine contractions. Denies headache, vision changes or epigastric pain. Reports good fetal movements.   gbs done today.   RTC in 1 weeks.     
Cardiology
Internal Medicine

## 2021-09-03 NOTE — DISCHARGE NOTE ADULT - LEARNING BEHAVIORAL ACTIVITIES TO COPE WITH URGES. FOR EXAMPLE, DISTRACTION AND CHANGING ROUTINES.
· No reported trauma  · Neurologically intact  · Neurosurgery consulted, note appreciated    · TLSO brace ordered  · Upright XR completed  · Multimodal pain regimen  · PT/OT-rehab Statement Selected

## 2022-03-08 NOTE — ED PROVIDER NOTE - TEMPLATE, MLM
Treatment Plan Evaluation     Patient: Jenae Callaway   MRN: [id number]  :2007    Age: 15 year old    Sex:female    Date: 3/8/2022   Time: 0900      Problem/Need List:   Depressive Symptoms, Anxiety with Panic Attacks, Disrupted Family Function and Impulse Control       Narrative Summary Update of Status and Plan:  Lena has been attending and participating in programming appropriately. She appears to get a long well with her peers and has been connecting well. She reports some increased stress and anxiety regarding school work. She appears to lack validation from family members which impact her mood. She has some cognitive distortions and black and white thinking. She continues to engage with friends and has been participating in soccer. She was able to work on a cognitive triangle and how to change her thinking. There are no safety concerns.       Medication Evaluation:  Current Outpatient Medications   Medication Sig     escitalopram (LEXAPRO) 20 MG tablet Take 20 mg by mouth daily     TANGELA 0.25-35 MG-MCG tablet      propranolol (INDERAL) 10 MG tablet Take 10 mg by mouth as needed     sertraline (ZOLOFT) 50 MG tablet Take 1/2 tablet (25mg) daily with food x 4 days.  If tolerating, increase to 1 tablet (50mg) daily.     No current facility-administered medications for this encounter.     Facility-Administered Medications Ordered in Other Encounters   Medication     benzocaine-menthol (CEPACOL) 15-3.6 MG lozenge 1 lozenge     calcium carbonate (TUMS) chewable tablet 1,000 mg     ibuprofen (ADVIL/MOTRIN) tablet 400 mg     propranolol (INDERAL) tablet 10 mg     No medication changes     Physical Health:  Problem(s)/Plan:  No physical problems       Legal Court:  Status /Plan:  Voluntary     Projected Length of Stay:  2-3 more weeks     Contributed to/Attended by:  Dr. Rob RILEY, Purvi Alvarez RN-BC, Kr[facility] Del[facility]         Respiratory

## 2022-11-22 NOTE — ED ADULT TRIAGE NOTE - AS O2 DELIVERY
room air Keystone Flap Text: The defect edges were debeveled with a #15 scalpel blade.  Given the location of the defect, shape of the defect a keystone flap was deemed most appropriate.  Using a sterile surgical marker, an appropriate keystone flap was drawn incorporating the defect, outlining the appropriate donor tissue and placing the expected incisions within the relaxed skin tension lines where possible. The area thus outlined was incised deep to adipose tissue with a #15 scalpel blade.  The skin margins were undermined to an appropriate distance in all directions around the primary defect and laterally outward around the flap utilizing iris scissors.

## 2023-06-22 NOTE — PROGRESS NOTE ADULT - SUBJECTIVE AND OBJECTIVE BOX
Darren López MD  Division of Hospital Medicine  Pager [phone]      DAISY SHELTON  63y  Male      Patient is a 63y old  Male who presents with a chief complaint of shortness of breath and leg swelling (18 Apr 2018 21:28)      INTERVAL HPI/OVERNIGHT EVENTS:  Seen at bedside. States that he feels tired this am. Legs feel the same as yesterday. No chest pain/SOB      REVIEW OF SYSTEMS: 14 point ROS negative unless listed above    T(C): 36.6 (04-20-18 @ 04:48), Max: 36.7 (04-19-18 @ 20:32)  HR: 64 (04-20-18 @ 04:48) (55 - 64)  BP: 161/71 (04-20-18 @ 04:48) (128/65 - 161/71)  RR: 18 (04-20-18 @ 04:48) (18 - 18)  SpO2: 93% (04-20-18 @ 04:48) (93% - 95%)  Wt(kg): --Vital Signs Last 24 Hrs  T(C): 36.6 (20 Apr 2018 04:48), Max: 36.7 (19 Apr 2018 20:32)  T(F): 97.9 (20 Apr 2018 04:48), Max: 98 (19 Apr 2018 20:32)  HR: 64 (20 Apr 2018 04:48) (55 - 64)  BP: 161/71 (20 Apr 2018 04:48) (128/65 - 161/71)  BP(mean): --  RR: 18 (20 Apr 2018 04:48) (18 - 18)  SpO2: 93% (20 Apr 2018 04:48) (93% - 95%)    PHYSICAL EXAM:  GENERAL: NAD, well-groomed, well-developed  HEAD:  Atraumatic, Normocephalic  ENMT: No tonsillar erythema, exudates,; Moist mucous membranes. No lesions  NECK: Supple, No JVD  CHEST/LUNG: CTA; No rales, rhonchi, wheezing, or rubs  HEART: Regular rate and rhythm; No murmurs, rubs, or gallops  ABDOMEN: Soft, Nontender, Nondistended; Bowel sounds present.  EXTREMITIES:  2+ Peripheral Pulses, No clubbing, cyanosis, +1 edema  PSYCH: Alert & Oriented x3      Consultant(s) Notes Reviewed:  [x ] YES  [ ] NO  Care Discussed with Consultants/Other Providers [ x] YES  [ ] NO    LABS:                        11.6   4.17  )-----------( 145      ( 20 Apr 2018 07:57 )             37.5     04-20    141  |  96  |  14  ----------------------------<  119<H>  4.0   |  36<H>  |  1.01    Ca    9.2      20 Apr 2018 08:45  Mg     2.0     04-20    TPro  6.6  /  Alb  3.5  /  TBili  0.5  /  DBili  x   /  AST  14  /  ALT  15  /  AlkPhos  64  04-19        CAPILLARY BLOOD GLUCOSE      POCT Blood Glucose.: 99 mg/dL (20 Apr 2018 07:25)  POCT Blood Glucose.: 153 mg/dL (19 Apr 2018 20:56)  POCT Blood Glucose.: 127 mg/dL (19 Apr 2018 16:31)  POCT Blood Glucose.: 160 mg/dL (19 Apr 2018 11:38)            RADIOLOGY & ADDITIONAL TESTS:    Imaging Personally Reviewed:  [x ] YES  [ ] NO Non-Graft Cartilage Fenestration Text: The cartilage was fenestrated with a 2mm punch biopsy to help facilitate healing.
